# Patient Record
Sex: MALE | Race: ASIAN | NOT HISPANIC OR LATINO | Employment: FULL TIME | URBAN - METROPOLITAN AREA
[De-identification: names, ages, dates, MRNs, and addresses within clinical notes are randomized per-mention and may not be internally consistent; named-entity substitution may affect disease eponyms.]

---

## 2021-07-25 ENCOUNTER — APPOINTMENT (OUTPATIENT)
Dept: GENERAL RADIOLOGY | Facility: HOSPITAL | Age: 51
End: 2021-07-25

## 2021-07-25 ENCOUNTER — HOSPITAL ENCOUNTER (EMERGENCY)
Facility: HOSPITAL | Age: 51
Discharge: HOME | End: 2021-07-25
Attending: EMERGENCY MEDICINE | Admitting: EMERGENCY MEDICINE

## 2021-07-25 VITALS
DIASTOLIC BLOOD PRESSURE: 78 MMHG | HEART RATE: 54 BPM | WEIGHT: 182.98 LBS | TEMPERATURE: 97.5 F | SYSTOLIC BLOOD PRESSURE: 120 MMHG | OXYGEN SATURATION: 97 % | HEIGHT: 69 IN | RESPIRATION RATE: 17 BRPM | BODY MASS INDEX: 27.1 KG/M2

## 2021-07-25 DIAGNOSIS — S90.211A CONTUSION OF RIGHT GREAT TOE WITH DAMAGE TO NAIL, INITIAL ENCOUNTER: ICD-10-CM

## 2021-07-25 DIAGNOSIS — S91.209A AVULSION OF TOENAIL OF RIGHT FOOT: Primary | ICD-10-CM

## 2021-07-25 PROCEDURE — 99282 EMERGENCY DEPT VISIT SF MDM: CPT

## 2021-07-25 PROCEDURE — 73660 X-RAY EXAM OF TOE(S): CPT

## 2021-07-25 RX ORDER — LIDOCAINE HYDROCHLORIDE 20 MG/ML
10 INJECTION, SOLUTION INFILTRATION; PERINEURAL ONCE
Status: COMPLETED | OUTPATIENT
Start: 2021-07-25 | End: 2021-07-25

## 2021-07-25 RX ORDER — CEPHALEXIN 500 MG/1
500 CAPSULE ORAL 4 TIMES DAILY
Qty: 40 CAPSULE | Refills: 0 | Status: SHIPPED | OUTPATIENT
Start: 2021-07-25 | End: 2021-08-04

## 2021-07-25 RX ORDER — GINSENG 100 MG
CAPSULE ORAL ONCE
Status: COMPLETED | OUTPATIENT
Start: 2021-07-25 | End: 2021-07-25

## 2021-07-25 RX ADMIN — LIDOCAINE HYDROCHLORIDE 3 ML: 20 INJECTION, SOLUTION INFILTRATION; PERINEURAL at 22:50

## 2021-07-25 RX ADMIN — Medication: at 23:46

## 2021-07-26 NOTE — DISCHARGE INSTRUCTIONS
Elevate, apply ice to the back of your foot for 20 mins on and 20 mins off. Change dressing in 48 hours, then daily.

## 2021-07-26 NOTE — ED PROVIDER NOTES
Subjective   Was dragging a heavy metal pole in his garage and hit his right big toe with it. Complaining of continued pain/swelling      History provided by:  Patient   used: No    Toe Injury  Location:  Toe  Toe location:  R great toe  Pain details:     Quality:  Throbbing    Radiates to:  Does not radiate    Severity:  Moderate    Onset quality:  Sudden    Timing:  Constant    Progression:  Worsening  Chronicity:  New  Dislocation: no    Foreign body present:  No foreign bodies  Prior injury to area:  No      Review of Systems   Constitutional: Negative.    HENT: Negative.    Eyes: Negative.    Respiratory: Negative.    Cardiovascular: Negative.    Gastrointestinal: Negative.    Endocrine: Negative.    Genitourinary: Negative.    Musculoskeletal: Negative.    Skin: Negative.    Allergic/Immunologic: Negative.    Neurological: Negative.    Hematological: Negative.    Psychiatric/Behavioral: Negative.        History reviewed. No pertinent past medical history.    No Known Allergies    History reviewed. No pertinent surgical history.    Family History   Problem Relation Age of Onset   • Heart failure Mother    • Hypertension Mother    • Heart failure Father    • Hypertension Father        Social History     Socioeconomic History   • Marital status:      Spouse name: Not on file   • Number of children: Not on file   • Years of education: Not on file   • Highest education level: Not on file   Tobacco Use   • Smoking status: Light Tobacco Smoker     Years: 30.00   • Smokeless tobacco: Never Used   Vaping Use   • Vaping Use: Some days   Substance and Sexual Activity   • Alcohol use: Yes     Alcohol/week: 1.0 standard drinks     Types: 1 Cans of beer per week     Comment: 6 PACK A WEEK           Objective   Physical Exam  Constitutional:       Appearance: Normal appearance.   HENT:      Head: Normocephalic and atraumatic.      Nose: Nose normal.      Mouth/Throat:      Mouth: Mucous membranes  are moist.   Eyes:      Pupils: Pupils are equal, round, and reactive to light.   Cardiovascular:      Rate and Rhythm: Normal rate and regular rhythm.      Pulses: Normal pulses.   Pulmonary:      Effort: Pulmonary effort is normal.      Breath sounds: Normal breath sounds.   Abdominal:      General: Abdomen is flat. Bowel sounds are normal.      Palpations: Abdomen is soft.   Musculoskeletal:      Cervical back: Normal range of motion.        Feet:       Comments: Partial nail avulsion, distal area of toe red and swollen   Skin:     General: Skin is warm and dry.      Capillary Refill: Capillary refill takes less than 2 seconds.   Neurological:      General: No focal deficit present.      Mental Status: He is alert and oriented to person, place, and time. Mental status is at baseline.   Psychiatric:         Mood and Affect: Mood normal.         Nail Removal    Date/Time: 7/25/2021 11:44 PM  Performed by: Rosa Isela Alvarez APRN  Authorized by: Wesley Faulkner MD     Consent:     Consent obtained:  Verbal    Consent given by:  Patient    Risks discussed:  Infection, pain, permanent nail deformity and bleeding  Location:     Foot:  R big toe  Pre-procedure details:     Skin preparation:  Alcohol and Betadine    Preparation: Patient was prepped and draped in the usual sterile fashion    Anesthesia (see MAR for exact dosages):     Anesthesia method:  Nerve block    Block location:  Right great toe    Block needle gauge:  27 G    Block anesthetic:  Lidocaine 1% w/o epi    Block injection procedure:  Anatomic landmarks identified, introduced needle, incremental injection, negative aspiration for blood and anatomic landmarks palpated    Block outcome:  Anesthesia achieved  Nail Removal:     Nail removed:  Complete    Nail bed repaired: no      Removed nail replaced and anchored: no    Post-procedure details:     Dressing:  Post-op shoe and antibiotic ointment    Patient tolerance of procedure:  Tolerated well, no immediate  complications               ED Course                                           MDM  Number of Diagnoses or Management Options  Avulsion of toenail of right foot: minor  Contusion of right great toe with damage to nail, initial encounter: new and requires workup     Amount and/or Complexity of Data Reviewed  Tests in the radiology section of CPT®: reviewed and ordered    Risk of Complications, Morbidity, and/or Mortality  Presenting problems: low  Diagnostic procedures: low  Management options: low    Patient Progress  Patient progress: stable      Final diagnoses:   Avulsion of toenail of right foot   Contusion of right great toe with damage to nail, initial encounter       ED Disposition  ED Disposition     ED Disposition Condition Comment    Discharge Stable           Provider, No Known  [facility]  [place] [address]      As needed         Medication List      New Prescriptions    cephalexin 500 MG capsule  Commonly known as: KEFLEX  Take 1 capsule by mouth 4 (Four) Times a Day for 10 days.     diclofenac 50 MG EC tablet  Commonly known as: VOLTAREN  Take 1 tablet by mouth 3 (Three) Times a Day.           Where to Get Your Medications      These medications were sent to [id number] - [place], [place] - [address] AT [facility] - [phone] [facility] [phone] FX  [address] [place] [address] [place] [address]    Hours: 24-hours Phone: [phone]   · cephalexin 500 MG capsule  · diclofenac 50 MG EC tablet          Rosa Isela Alvarez, APRN  07/25/21 [id number]

## 2022-01-24 ENCOUNTER — LAB (OUTPATIENT)
Dept: LAB | Facility: HOSPITAL | Age: 52
End: 2022-01-24

## 2022-01-24 ENCOUNTER — TRANSCRIBE ORDERS (OUTPATIENT)
Dept: LAB | Facility: HOSPITAL | Age: 52
End: 2022-01-24

## 2022-01-24 DIAGNOSIS — E55.9 VITAMIN D DEFICIENCY: ICD-10-CM

## 2022-01-24 DIAGNOSIS — N39.0 URINARY TRACT INFECTION WITHOUT HEMATURIA, SITE UNSPECIFIED: ICD-10-CM

## 2022-01-24 DIAGNOSIS — Z94.0 KIDNEY TRANSPLANTED: ICD-10-CM

## 2022-01-24 DIAGNOSIS — Z94.0 KIDNEY TRANSPLANTED: Primary | ICD-10-CM

## 2022-01-24 LAB
25(OH)D3 SERPL-MCNC: 27.2 NG/ML
ALBUMIN SERPL-MCNC: 4.6 G/DL (ref 3.5–5.2)
ALBUMIN UR-MCNC: <1.2 MG/DL
ALBUMIN/GLOB SERPL: 1.8 G/DL
ALP SERPL-CCNC: 48 U/L (ref 39–117)
ALT SERPL W P-5'-P-CCNC: 28 U/L (ref 1–41)
ANION GAP SERPL CALCULATED.3IONS-SCNC: 7.4 MMOL/L (ref 5–15)
AST SERPL-CCNC: 20 U/L (ref 1–40)
BASOPHILS # BLD AUTO: 0.05 10*3/MM3 (ref 0–0.2)
BASOPHILS NFR BLD AUTO: 0.8 % (ref 0–1.5)
BILIRUB SERPL-MCNC: 0.5 MG/DL (ref 0–1.2)
BILIRUB UR QL STRIP: NEGATIVE
BUN SERPL-MCNC: 12 MG/DL (ref 6–20)
BUN/CREAT SERPL: 10.6 (ref 7–25)
CALCIUM SPEC-SCNC: 9.8 MG/DL (ref 8.6–10.5)
CHLORIDE SERPL-SCNC: 105 MMOL/L (ref 98–107)
CLARITY UR: CLEAR
CO2 SERPL-SCNC: 24.6 MMOL/L (ref 22–29)
COLOR UR: YELLOW
CREAT SERPL-MCNC: 1.13 MG/DL (ref 0.76–1.27)
CREAT UR-MCNC: 32.4 MG/DL
CREAT UR-MCNC: 36.5 MG/DL
DEPRECATED RDW RBC AUTO: 40.9 FL (ref 37–54)
EOSINOPHIL # BLD AUTO: 0.1 10*3/MM3 (ref 0–0.4)
EOSINOPHIL NFR BLD AUTO: 1.6 % (ref 0.3–6.2)
ERYTHROCYTE [DISTWIDTH] IN BLOOD BY AUTOMATED COUNT: 12.8 % (ref 12.3–15.4)
GFR SERPL CREATININE-BSD FRML MDRD: 69 ML/MIN/1.73
GFR SERPL CREATININE-BSD FRML MDRD: 84 ML/MIN/1.73
GLOBULIN UR ELPH-MCNC: 2.5 GM/DL
GLUCOSE SERPL-MCNC: 93 MG/DL (ref 65–99)
GLUCOSE UR STRIP-MCNC: NEGATIVE MG/DL
HCT VFR BLD AUTO: 48.2 % (ref 37.5–51)
HGB BLD-MCNC: 16.9 G/DL (ref 13–17.7)
HGB UR QL STRIP.AUTO: NEGATIVE
IMM GRANULOCYTES # BLD AUTO: 0.01 10*3/MM3 (ref 0–0.05)
IMM GRANULOCYTES NFR BLD AUTO: 0.2 % (ref 0–0.5)
KETONES UR QL STRIP: NEGATIVE
LEUKOCYTE ESTERASE UR QL STRIP.AUTO: NEGATIVE
LYMPHOCYTES # BLD AUTO: 1.34 10*3/MM3 (ref 0.7–3.1)
LYMPHOCYTES NFR BLD AUTO: 20.8 % (ref 19.6–45.3)
MCH RBC QN AUTO: 30.8 PG (ref 26.6–33)
MCHC RBC AUTO-ENTMCNC: 35.1 G/DL (ref 31.5–35.7)
MCV RBC AUTO: 87.8 FL (ref 79–97)
MICROALBUMIN/CREAT UR: NORMAL MG/G{CREAT}
MONOCYTES # BLD AUTO: 0.39 10*3/MM3 (ref 0.1–0.9)
MONOCYTES NFR BLD AUTO: 6.1 % (ref 5–12)
NEUTROPHILS NFR BLD AUTO: 4.54 10*3/MM3 (ref 1.7–7)
NEUTROPHILS NFR BLD AUTO: 70.5 % (ref 42.7–76)
NITRITE UR QL STRIP: NEGATIVE
NRBC BLD AUTO-RTO: 0 /100 WBC (ref 0–0.2)
PH UR STRIP.AUTO: 6 [PH] (ref 5–8)
PLATELET # BLD AUTO: 263 10*3/MM3 (ref 140–450)
PMV BLD AUTO: 10.2 FL (ref 6–12)
POTASSIUM SERPL-SCNC: 3.9 MMOL/L (ref 3.5–5.2)
PROT ?TM UR-MCNC: <4 MG/DL
PROT SERPL-MCNC: 7.1 G/DL (ref 6–8.5)
PROT UR QL STRIP: NEGATIVE
PROT/CREAT UR: NORMAL MG/G{CREAT}
RBC # BLD AUTO: 5.49 10*6/MM3 (ref 4.14–5.8)
SODIUM SERPL-SCNC: 137 MMOL/L (ref 136–145)
SP GR UR STRIP: 1.01 (ref 1–1.03)
UROBILINOGEN UR QL STRIP: NORMAL
WBC NRBC COR # BLD: 6.43 10*3/MM3 (ref 3.4–10.8)

## 2022-01-24 PROCEDURE — 84156 ASSAY OF PROTEIN URINE: CPT

## 2022-01-24 PROCEDURE — 81003 URINALYSIS AUTO W/O SCOPE: CPT

## 2022-01-24 PROCEDURE — 82652 VIT D 1 25-DIHYDROXY: CPT

## 2022-01-24 PROCEDURE — 85025 COMPLETE CBC W/AUTO DIFF WBC: CPT

## 2022-01-24 PROCEDURE — 82306 VITAMIN D 25 HYDROXY: CPT

## 2022-01-24 PROCEDURE — 82570 ASSAY OF URINE CREATININE: CPT

## 2022-01-24 PROCEDURE — 36415 COLL VENOUS BLD VENIPUNCTURE: CPT

## 2022-01-24 PROCEDURE — 80053 COMPREHEN METABOLIC PANEL: CPT

## 2022-01-24 PROCEDURE — 87086 URINE CULTURE/COLONY COUNT: CPT

## 2022-01-24 PROCEDURE — 82043 UR ALBUMIN QUANTITATIVE: CPT

## 2022-01-26 LAB
1,25(OH)2D SERPL-MCNC: 50.1 PG/ML (ref 19.9–79.3)
BACTERIA SPEC AEROBE CULT: NO GROWTH

## 2024-02-01 ENCOUNTER — TELEPHONE (OUTPATIENT)
Dept: ORTHOPEDIC SURGERY | Facility: CLINIC | Age: 54
End: 2024-02-01
Payer: COMMERCIAL

## 2024-02-01 NOTE — TELEPHONE ENCOUNTER
BILATERAL CARPAL TUNNEL. RCV'D AND INDEXED- PN 6.6.22, OP REPORT 4.12.22, PATHOLOGY 2.15.22   Rhomboid Transposition Flap Text: The defect edges were debeveled with a #15 scalpel blade.  Given the location of the defect and the proximity to free margins a rhomboid transposition flap was deemed most appropriate.  Using a sterile surgical marker, an appropriate rhomboid flap was drawn incorporating the defect.    The area thus outlined was incised deep to adipose tissue with a #15 scalpel blade.  The skin margins were undermined to an appropriate distance in all directions utilizing iris scissors.

## 2024-03-28 ENCOUNTER — TELEPHONE (OUTPATIENT)
Dept: ORTHOPEDIC SURGERY | Facility: CLINIC | Age: 54
End: 2024-03-28

## 2024-03-28 ENCOUNTER — OFFICE VISIT (OUTPATIENT)
Dept: ORTHOPEDIC SURGERY | Facility: CLINIC | Age: 54
End: 2024-03-28
Payer: OTHER MISCELLANEOUS

## 2024-03-28 VITALS
SYSTOLIC BLOOD PRESSURE: 147 MMHG | HEIGHT: 69 IN | OXYGEN SATURATION: 98 % | DIASTOLIC BLOOD PRESSURE: 99 MMHG | WEIGHT: 169 LBS | BODY MASS INDEX: 25.03 KG/M2 | HEART RATE: 64 BPM

## 2024-03-28 DIAGNOSIS — S69.92XA LEFT WRIST INJURY, INITIAL ENCOUNTER: ICD-10-CM

## 2024-03-28 DIAGNOSIS — M25.532 LEFT WRIST PAIN: ICD-10-CM

## 2024-03-28 DIAGNOSIS — S52.501A CLOSED FRACTURE OF DISTAL END OF RIGHT RADIUS, UNSPECIFIED FRACTURE MORPHOLOGY, INITIAL ENCOUNTER: ICD-10-CM

## 2024-03-28 DIAGNOSIS — M79.602 LEFT ARM PAIN: Primary | ICD-10-CM
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
## 2024-05-16 ENCOUNTER — TREATMENT (OUTPATIENT)
Dept: PHYSICAL THERAPY | Facility: CLINIC | Age: 54
End: 2024-05-16
Payer: COMMERCIAL

## 2024-05-16 DIAGNOSIS — S52.521D CLOSED TORUS FRACTURE OF DISTAL END OF RIGHT RADIUS WITH ROUTINE HEALING, SUBSEQUENT ENCOUNTER: Primary | ICD-10-CM

## 2024-05-16 DIAGNOSIS — M25.631 STIFFNESS OF RIGHT WRIST JOINT: ICD-10-CM

## 2024-05-16 DIAGNOSIS — M25.531 RIGHT WRIST PAIN: ICD-10-CM

## 2024-05-16 DIAGNOSIS — S52.571S: ICD-10-CM
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
## 2024-05-20 ENCOUNTER — TREATMENT (OUTPATIENT)
Dept: PHYSICAL THERAPY | Facility: CLINIC | Age: 54
End: 2024-05-20
Payer: COMMERCIAL

## 2024-05-20 DIAGNOSIS — M25.631 STIFFNESS OF RIGHT WRIST JOINT: ICD-10-CM

## 2024-05-20 DIAGNOSIS — S52.521D CLOSED TORUS FRACTURE OF DISTAL END OF RIGHT RADIUS WITH ROUTINE HEALING, SUBSEQUENT ENCOUNTER: Primary | ICD-10-CM

## 2024-05-20 DIAGNOSIS — M25.531 RIGHT WRIST PAIN: ICD-10-CM

## 2024-05-20 DIAGNOSIS — S52.571S: ICD-10-CM

## 2024-05-20 PROCEDURE — 97110 THERAPEUTIC EXERCISES: CPT | Performed by: OCCUPATIONAL THERAPIST

## 2024-05-20 PROCEDURE — 97530 THERAPEUTIC ACTIVITIES: CPT | Performed by: OCCUPATIONAL THERAPIST

## 2024-05-20 PROCEDURE — 97140 MANUAL THERAPY 1/> REGIONS: CPT | Performed by: OCCUPATIONAL THERAPIST

## 2024-05-20 PROCEDURE — 97112 NEUROMUSCULAR REEDUCATION: CPT | Performed by: OCCUPATIONAL THERAPIST

## 2024-05-20 NOTE — LETTER
Progress Note  5/20/2024  Cecil Bell MD    Re: Mendel Mg  ________________________________________________________________    Mr. Mendel Mg, has attended 2/2 OT sessions.  Treatment has consisted of: AROM, PROM, scar mgmt, pain mgmt, neuro re-ed, and functional re-ed     S: Mr. Mendel Mg states: exercises went well and currently not having any pain.     O:   0-65     0-65  0-85 0-95 0-55  0-90 0-90 0-65  0-90 0-90 0-65  0-90 0-90 0-65     Pro 60 degrees  Sup 40 degrees    Shoulder flexion 160 degrees  Shoulder  degrees  Elbow 0-150 degrees      A: Started pro/ sup in clinic only.  Pt is doing excellent.  Scar is adhered, working on scar massage and scar pad provided.     P: Please advise after your exam.    Thank you for this referral.

## 2024-05-20 NOTE — PROGRESS NOTES
Occupational Therapy Daily Treatment Note   [address] [place] [address]    Patient: Mendel Mg   : 1970  Referring practitioner: Cecil Bell MD  Date of Initial Visit: Type: THERAPY  Noted: 2024  Today's Date: 2024  Patient seen for 2 sessions    ICD-10-CM ICD-9-CM   1. Closed torus fracture of distal end of right radius with routine healing, subsequent encounter  S52.521D V54.12   2. Closed die-punch fracture of right radius, sequela  S52.571S 905.2   3. Stiffness of right wrist joint  M25.631 719.53   4. Right wrist pain  M25.531 719.43          Mendel Mg reports The exercises went well, the       Functional status  I am sleeping better now that the brace isn't rubbing.    Objective   See Exercise, Manual, and Modality Logs for complete treatment.   Shoulder flexion 160 degrees  Shoulder  degrees  Elbow 0-150 degrees  Pro 60 degrees  Sup 40 degrees    Scar pad made for night use.     Assessment/Plan    Started pro/ sup in clinic only.  Pt is doing excellent.  Scar is adhered, working on scar massage and scar pad provided.    Cont per POC           Timed:  Manual Therapy:    10     mins  54013;  Therapeutic Exercise:    20     mins  48437;  Therapeutic Activity:    10     mins  93733;     Neuromuscular Ambrocio:    10    mins  46851;    Ultrasound:     0     mins  78054;    Electrical Stimulation:    0     mins  90735;    Untimed:  Electrical Stimulation:    0     mins  77028 ( );  Fluidotherapy:        0    mins  45964  Dry Needlin    mins  08340    Timed Treatment:   50   mins   Total Treatment:     50   mins    OT SIGNATURE: DANIA Groves, OTR/L, CHT     Electronically signed    [place] LICENSE: [id number]

## 2024-05-30 ENCOUNTER — TREATMENT (OUTPATIENT)
Dept: PHYSICAL THERAPY | Facility: CLINIC | Age: 54
End: 2024-05-30
Payer: COMMERCIAL

## 2024-05-30 DIAGNOSIS — M25.531 RIGHT WRIST PAIN: ICD-10-CM

## 2024-05-30 DIAGNOSIS — S52.521D CLOSED TORUS FRACTURE OF DISTAL END OF RIGHT RADIUS WITH ROUTINE HEALING, SUBSEQUENT ENCOUNTER: Primary | ICD-10-CM

## 2024-05-30 DIAGNOSIS — S52.571S: ICD-10-CM

## 2024-05-30 DIAGNOSIS — M25.631 STIFFNESS OF RIGHT WRIST JOINT: ICD-10-CM

## 2024-05-30 NOTE — PROGRESS NOTES
Occupational Therapy Daily Treatment Note   [address] [place] [address]    Patient: Mendel Mg   : 1970  Referring practitioner: Cecil Bell MD  Date of Initial Visit: Type: THERAPY  Noted: 2024  Today's Date: 2024  Patient seen for 3 sessions    ICD-10-CM ICD-9-CM   1. Closed torus fracture of distal end of right radius with routine healing, subsequent encounter  S52.521D V54.12   2. Closed die-punch fracture of right radius, sequela  S52.571S 905.2   3. Stiffness of right wrist joint  M25.631 719.53   4. Right wrist pain  M25.531 719.43          Mendel Mg reports Exercises are working, I have no pain, and feel great.        Functional status playing guitar in the brace.    Objective   See Exercise, Manual, and Modality Logs for complete treatment.   0-65     0-75  0-90 0-95 0-60  0-90 0-95 0-60  0-90 0-95 0-60  0-90 0-90 0-60    Pro 45 degrees  Supination 45 degrees    Shoulder and elbow WFL    Orthotic cut down to 5 cm distal to antecubital flexion crease to distal palmar crease for long form forearm-based wrist support, should stay in the brace full-time per MD order.   Educated patient to wear scar pad nightly, clean with mild soap and water.    Assessment/Plan  Plan to hold therapy, as patient has full fist, elbow AROM and shoulder ROM at this time.  Pt will call when he returns to MD and has a surgical date/return to therapy estimate.  Educated patient to call in the mean time if questions or orthotic needs come up.       Cont per POC           Timed:  Manual Therapy:    10     mins  54217;  Therapeutic Exercise:    10     mins  12797;  Therapeutic Activity:    0     mins  43657;     Neuromuscular Ambrocio:    0    mins  39494;    Ultrasound:     0     mins  18770;    Orthtoic modification:    10     mins  42793;    Untimed:  Electrical Stimulation:    0     mins  06674 ( );  Fluidotherapy:        0    mins  11882  Dry Needlin    mins   32121    Timed Treatment:   30   mins   Total Treatment:     30   mins    OT SIGNATURE: DANIA Groves, OTR/L, CHT     Electronically signed    [place] LICENSE: [id number]

## 2024-08-01 ENCOUNTER — DOCUMENTATION (OUTPATIENT)
Dept: PHYSICAL THERAPY | Facility: CLINIC | Age: 54
End: 2024-08-01
Payer: COMMERCIAL

## 2024-08-01 NOTE — PROGRESS NOTES
Discharge Summary  Discharge Summary from Occupational Therapy   [address] [place] [id number]    Patient Information  Mendel Mg  1970    Dates OT visit: 5/16/24-5/30/24  Number of Visits: 3     Discharge Status of Patient: See MD Note dated 5/30/24    Goals: Partially Met    Visit Diagnoses:  1. Closed torus fracture of distal end of right radius with routine healing, subsequent encounter  S52.521D V54.12   2. Closed die-punch fracture of right radius, sequela  S52.571S 905.2   3. Stiffness of right wrist joint  M25.521 719.53   4. Right wrist pain  M25.531 719.43       Discharge Plan: Continue with current home exercise program as instructed  Future need for rehabilitation activities  Patient to return to referring/providing physician    Comments Plan to hold therapy, as patient has full fist, elbow AROM and shoulder ROM at this time. Pt will call when he returns to MD and has a surgical date/return to therapy estimate. Educated patient to call in the mean time if questions or orthotic needs come up.     Date of Discharge 8/1/24        DANIA Groves, OTR/L, CHT  Occupational Therapist, Certified Hand therapist    Electronically Signed   [place] LICENSE: [id number]

## 2024-10-02 ENCOUNTER — TRANSCRIBE ORDERS (OUTPATIENT)
Dept: PHYSICAL THERAPY | Facility: CLINIC | Age: 54
End: 2024-10-02
Payer: COMMERCIAL

## 2024-10-02 DIAGNOSIS — S52.571A OTHER CLOSED INTRA-ARTICULAR FRACTURE OF DISTAL END OF RIGHT RADIUS, INITIAL ENCOUNTER: Primary | ICD-10-CM

## 2024-10-02 DIAGNOSIS — Z98.890 POSTOPERATIVE STATE: ICD-10-CM

## 2024-10-02 DIAGNOSIS — Z98.890 S/P HARDWARE REMOVAL: ICD-10-CM

## 2024-10-02 DIAGNOSIS — Z74.09 STIFFNESS DUE TO IMMOBILITY: ICD-10-CM

## 2024-10-02 DIAGNOSIS — M25.60 STIFFNESS DUE TO IMMOBILITY: ICD-10-CM

## 2024-10-24 ENCOUNTER — APPOINTMENT (OUTPATIENT)
Dept: CT IMAGING | Facility: HOSPITAL | Age: 54
End: 2024-10-24
Payer: COMMERCIAL

## 2024-10-24 ENCOUNTER — HOSPITAL ENCOUNTER (EMERGENCY)
Facility: HOSPITAL | Age: 54
Discharge: HOME | End: 2024-10-24
Attending: EMERGENCY MEDICINE | Admitting: EMERGENCY MEDICINE
Payer: COMMERCIAL

## 2024-10-24 VITALS
WEIGHT: 172.18 LBS | HEIGHT: 69 IN | BODY MASS INDEX: 25.5 KG/M2 | HEART RATE: 68 BPM | TEMPERATURE: 98.8 F | SYSTOLIC BLOOD PRESSURE: 161 MMHG | OXYGEN SATURATION: 98 % | DIASTOLIC BLOOD PRESSURE: 94 MMHG | RESPIRATION RATE: 18 BRPM

## 2024-10-24 DIAGNOSIS — R31.9 HEMATURIA, UNSPECIFIED TYPE: Primary | ICD-10-CM

## 2024-10-24 DIAGNOSIS — N21.0 BLADDER STONES: ICD-10-CM

## 2024-10-24 LAB
ALBUMIN SERPL-MCNC: 4.4 G/DL (ref 3.5–5.2)
ALBUMIN/GLOB SERPL: 1.5 G/DL
ALP SERPL-CCNC: 56 U/L (ref 39–117)
ALT SERPL W P-5'-P-CCNC: 20 U/L (ref 1–41)
ANION GAP SERPL CALCULATED.3IONS-SCNC: 9.2 MMOL/L (ref 5–15)
AST SERPL-CCNC: 15 U/L (ref 1–40)
BACTERIA UR QL AUTO: ABNORMAL /HPF
BASOPHILS # BLD AUTO: 0.02 10*3/MM3 (ref 0–0.2)
BASOPHILS NFR BLD AUTO: 0.3 % (ref 0–1.5)
BILIRUB SERPL-MCNC: 0.5 MG/DL (ref 0–1.2)
BILIRUB UR QL STRIP: NEGATIVE
BUN SERPL-MCNC: 14 MG/DL (ref 6–20)
BUN/CREAT SERPL: 12.1 (ref 7–25)
CALCIUM SPEC-SCNC: 9.7 MG/DL (ref 8.6–10.5)
CHLORIDE SERPL-SCNC: 104 MMOL/L (ref 98–107)
CLARITY UR: CLEAR
CO2 SERPL-SCNC: 25.8 MMOL/L (ref 22–29)
COLOR UR: YELLOW
CREAT SERPL-MCNC: 1.16 MG/DL (ref 0.76–1.27)
D-LACTATE SERPL-SCNC: 0.9 MMOL/L (ref 0.5–2)
DEPRECATED RDW RBC AUTO: 40.6 FL (ref 37–54)
EGFRCR SERPLBLD CKD-EPI 2021: 75.8 ML/MIN/1.73
EOSINOPHIL # BLD AUTO: 0.06 10*3/MM3 (ref 0–0.4)
EOSINOPHIL NFR BLD AUTO: 1 % (ref 0.3–6.2)
ERYTHROCYTE [DISTWIDTH] IN BLOOD BY AUTOMATED COUNT: 13 % (ref 12.3–15.4)
GLOBULIN UR ELPH-MCNC: 3 GM/DL
GLUCOSE SERPL-MCNC: 111 MG/DL (ref 65–99)
GLUCOSE UR STRIP-MCNC: NEGATIVE MG/DL
HCT VFR BLD AUTO: 49.1 % (ref 37.5–51)
HGB BLD-MCNC: 17.2 G/DL (ref 13–17.7)
HGB UR QL STRIP.AUTO: ABNORMAL
HOLD SPECIMEN: NORMAL
HOLD SPECIMEN: NORMAL
HYALINE CASTS UR QL AUTO: ABNORMAL /LPF
IMM GRANULOCYTES # BLD AUTO: 0.02 10*3/MM3 (ref 0–0.05)
IMM GRANULOCYTES NFR BLD AUTO: 0.3 % (ref 0–0.5)
KETONES UR QL STRIP: NEGATIVE
LEUKOCYTE ESTERASE UR QL STRIP.AUTO: ABNORMAL
LIPASE SERPL-CCNC: 51 U/L (ref 13–60)
LYMPHOCYTES # BLD AUTO: 1.07 10*3/MM3 (ref 0.7–3.1)
LYMPHOCYTES NFR BLD AUTO: 17.2 % (ref 19.6–45.3)
MCH RBC QN AUTO: 30.3 PG (ref 26.6–33)
MCHC RBC AUTO-ENTMCNC: 35 G/DL (ref 31.5–35.7)
MCV RBC AUTO: 86.4 FL (ref 79–97)
MONOCYTES # BLD AUTO: 0.2 10*3/MM3 (ref 0.1–0.9)
MONOCYTES NFR BLD AUTO: 3.2 % (ref 5–12)
NEUTROPHILS NFR BLD AUTO: 4.86 10*3/MM3 (ref 1.7–7)
NEUTROPHILS NFR BLD AUTO: 78 % (ref 42.7–76)
NITRITE UR QL STRIP: NEGATIVE
NRBC BLD AUTO-RTO: 0 /100 WBC (ref 0–0.2)
PH UR STRIP.AUTO: 6 [PH] (ref 5–8)
PLATELET # BLD AUTO: 289 10*3/MM3 (ref 140–450)
PMV BLD AUTO: 9.3 FL (ref 6–12)
POTASSIUM SERPL-SCNC: 4.4 MMOL/L (ref 3.5–5.2)
PROT SERPL-MCNC: 7.4 G/DL (ref 6–8.5)
PROT UR QL STRIP: ABNORMAL
RBC # BLD AUTO: 5.68 10*6/MM3 (ref 4.14–5.8)
RBC # UR STRIP: ABNORMAL /HPF
REF LAB TEST METHOD: ABNORMAL
SODIUM SERPL-SCNC: 139 MMOL/L (ref 136–145)
SP GR UR STRIP: 1.02 (ref 1–1.03)
SQUAMOUS #/AREA URNS HPF: ABNORMAL /HPF
UROBILINOGEN UR QL STRIP: ABNORMAL
WBC # UR STRIP: ABNORMAL /HPF
WBC NRBC COR # BLD AUTO: 6.23 10*3/MM3 (ref 3.4–10.8)
WHOLE BLOOD HOLD COAG: NORMAL
WHOLE BLOOD HOLD SPECIMEN: NORMAL

## 2024-10-24 PROCEDURE — 83690 ASSAY OF LIPASE: CPT | Performed by: EMERGENCY MEDICINE

## 2024-10-24 PROCEDURE — 80053 COMPREHEN METABOLIC PANEL: CPT | Performed by: EMERGENCY MEDICINE

## 2024-10-24 PROCEDURE — 83605 ASSAY OF LACTIC ACID: CPT | Performed by: EMERGENCY MEDICINE

## 2024-10-24 PROCEDURE — 99284 EMERGENCY DEPT VISIT MOD MDM: CPT

## 2024-10-24 PROCEDURE — 85025 COMPLETE CBC W/AUTO DIFF WBC: CPT | Performed by: EMERGENCY MEDICINE

## 2024-10-24 PROCEDURE — 81001 URINALYSIS AUTO W/SCOPE: CPT | Performed by: EMERGENCY MEDICINE

## 2024-10-24 PROCEDURE — 74176 CT ABD & PELVIS W/O CONTRAST: CPT

## 2024-10-24 RX ORDER — TAMSULOSIN HYDROCHLORIDE 0.4 MG/1
1 CAPSULE ORAL DAILY
Qty: 30 CAPSULE | Refills: 0 | Status: SHIPPED | OUTPATIENT
Start: 2024-10-24

## 2024-10-24 RX ORDER — SODIUM CHLORIDE 0.9 % (FLUSH) 0.9 %
10 SYRINGE (ML) INJECTION AS NEEDED
Status: DISCONTINUED | OUTPATIENT
Start: 2024-10-24 | End: 2024-10-24 | Stop reason: HOSPADM

## 2024-10-24 NOTE — ED PROVIDER NOTES
Time: 10:39 AM EDT  Date of encounter:  10/24/2024  Independent Historian/Clinical History and Information was obtained by:   Patient and Family    History is limited by: N/A    Chief Complaint: Blood in urine      History of Present Illness:  Patient is a 54 y.o. year old male who presents to the emergency department for evaluation of hematuria.  Patient reports urinating normal this morning and then feeling the need to urinate again but it was bloody.  Denies history of kidney stones.  Does report a remote history of renal transplant due to unknown cause of renal failure when he was 20 years old.  Did have a familial donor and has been off of immunosuppressants for many years as well.  Denies fatigue nausea generalized weakness, fever.      Patient Care Team  Primary Care Provider: Provider, No Known    Past Medical History:     No Known Allergies  Past Medical History:   Diagnosis Date    Carpal tunnel syndrome     Lt/rt wrists     Past Surgical History:   Procedure Laterality Date    CARPAL TUNNEL RELEASE       Family History   Problem Relation Age of Onset    Heart failure Mother     Hypertension Mother     Heart failure Father     Hypertension Father        Home Medications:  Prior to Admission medications    Medication Sig Start Date End Date Taking? Authorizing Provider   diclofenac (VOLTAREN) 50 MG EC tablet Take 1 tablet by mouth 3 (Three) Times a Day.  Patient not taking: Reported on 3/28/2024 7/25/21   Rosa Isela Alvarez APRN        Social History:   Social History     Tobacco Use    Smoking status: Former     Current packs/day: 0.00     Types: Cigarettes     Quit date:      Years since quittin.8    Smokeless tobacco: Never   Vaping Use    Vaping status: Former    Substances: Nicotine   Substance Use Topics    Alcohol use: Yes     Alcohol/week: 1.0 standard drink of alcohol     Types: 1 Cans of beer per week     Comment: 6 PACK A WEEK    Drug use: Never         Review of Systems:  Review of  "Systems   Constitutional:  Negative for chills and fever.   HENT:  Negative for congestion, rhinorrhea and sore throat.    Eyes:  Negative for pain and visual disturbance.   Respiratory:  Negative for apnea, cough, chest tightness and shortness of breath.    Cardiovascular:  Negative for chest pain and palpitations.   Gastrointestinal:  Negative for abdominal pain, diarrhea, nausea and vomiting.   Genitourinary:  Positive for hematuria. Negative for difficulty urinating and dysuria.   Musculoskeletal:  Negative for joint swelling and myalgias.   Skin:  Negative for color change.   Neurological:  Negative for seizures and headaches.   Psychiatric/Behavioral: Negative.     All other systems reviewed and are negative.       Physical Exam:  /98 (BP Location: Left arm, Patient Position: Sitting)   Pulse 67   Temp 98.8 °F (37.1 °C) (Oral)   Resp 17   Ht 175.3 cm (69\")   Wt 78.1 kg (172 lb 2.9 oz)   SpO2 99%   BMI 25.43 kg/m²     Physical Exam  Vitals and nursing note reviewed.   Constitutional:       General: He is not in acute distress.     Appearance: Normal appearance. He is not toxic-appearing.   HENT:      Head: Normocephalic and atraumatic.      Jaw: There is normal jaw occlusion.   Eyes:      General: Lids are normal.      Extraocular Movements: Extraocular movements intact.      Conjunctiva/sclera: Conjunctivae normal.      Pupils: Pupils are equal, round, and reactive to light.   Cardiovascular:      Rate and Rhythm: Normal rate and regular rhythm.      Pulses: Normal pulses.      Heart sounds: Normal heart sounds.   Pulmonary:      Effort: Pulmonary effort is normal. No respiratory distress.      Breath sounds: Normal breath sounds. No wheezing or rhonchi.   Abdominal:      General: Abdomen is flat.      Palpations: Abdomen is soft.      Tenderness: There is no abdominal tenderness. There is no guarding or rebound.   Musculoskeletal:         General: Normal range of motion.      Cervical back: " Normal range of motion and neck supple.      Right lower leg: No edema.      Left lower leg: No edema.   Skin:     General: Skin is warm and dry.   Neurological:      Mental Status: He is alert and oriented to person, place, and time. Mental status is at baseline.   Psychiatric:         Mood and Affect: Mood normal.                  Procedures:  Procedures      Medical Decision Making:      Comorbidities that affect care:    Status post renal transplant    External Notes reviewed:    Previous Clinic Note: In surgery office visit for radius fracture management      The following orders were placed and all results were independently analyzed by me:  Orders Placed This Encounter   Procedures    CT Abdomen Pelvis Without Contrast    [place] Draw    Comprehensive Metabolic Panel    Lipase    Urinalysis With Microscopic If Indicated (No Culture) - Urine, Clean Catch    Lactic Acid, Plasma    CBC Auto Differential    Urinalysis, Microscopic Only - Urine, Clean Catch    NPO Diet NPO Type: Strict NPO    Undress & Gown    Insert Peripheral IV    CBC & Differential    Green Top (Gel)    Lavender Top    Gold Top - SST    Light Blue Top       Medications Given in the Emergency Department:  Medications   sodium chloride 0.9 % flush 10 mL (has no administration in time range)        ED Course:         Labs:    Lab Results (last 24 hours)       Procedure Component Value Units Date/Time    CBC & Differential [[id number]]  (Abnormal) Collected: 10/24/24 1003    Specimen: Blood Updated: 10/24/24 1015    Narrative:      The following orders were created for panel order CBC & Differential.  Procedure                               Abnormality         Status                     ---------                               -----------         ------                     CBC Auto Differential[[id number]]        Abnormal            Final result                 Please view results for these tests on the individual orders.    Comprehensive Metabolic  Panel [[id number]]  (Abnormal) Collected: 10/24/24 1003    Specimen: Blood Updated: 10/24/24 1032     Glucose 111 mg/dL      BUN 14 mg/dL      Creatinine 1.16 mg/dL      Sodium 139 mmol/L      Potassium 4.4 mmol/L      Chloride 104 mmol/L      CO2 25.8 mmol/L      Calcium 9.7 mg/dL      Total Protein 7.4 g/dL      Albumin 4.4 g/dL      ALT (SGPT) 20 U/L      AST (SGOT) 15 U/L      Alkaline Phosphatase 56 U/L      Total Bilirubin 0.5 mg/dL      Globulin 3.0 gm/dL      A/G Ratio 1.5 g/dL      BUN/Creatinine Ratio 12.1     Anion Gap 9.2 mmol/L      eGFR 75.8 mL/min/1.73     Narrative:      GFR Normal >60  Chronic Kidney Disease <60  Kidney Failure <15      Lipase [[id number]]  (Normal) Collected: 10/24/24 1003    Specimen: Blood Updated: 10/24/24 1047     Lipase 51 U/L     Lactic Acid, Plasma [[id number]]  (Normal) Collected: 10/24/24 1003    Specimen: Blood Updated: 10/24/24 1032     Lactate 0.9 mmol/L     CBC Auto Differential [[id number]]  (Abnormal) Collected: 10/24/24 1003    Specimen: Blood Updated: 10/24/24 1015     WBC 6.23 10*3/mm3      RBC 5.68 10*6/mm3      Hemoglobin 17.2 g/dL      Hematocrit 49.1 %      MCV 86.4 fL      MCH 30.3 pg      MCHC 35.0 g/dL      RDW 13.0 %      RDW-SD 40.6 fl      MPV 9.3 fL      Platelets 289 10*3/mm3      Neutrophil % 78.0 %      Lymphocyte % 17.2 %      Monocyte % 3.2 %      Eosinophil % 1.0 %      Basophil % 0.3 %      Immature Grans % 0.3 %      Neutrophils, Absolute 4.86 10*3/mm3      Lymphocytes, Absolute 1.07 10*3/mm3      Monocytes, Absolute 0.20 10*3/mm3      Eosinophils, Absolute 0.06 10*3/mm3      Basophils, Absolute 0.02 10*3/mm3      Immature Grans, Absolute 0.02 10*3/mm3      nRBC 0.0 /100 WBC     Urinalysis With Microscopic If Indicated (No Culture) - Urine, Clean Catch [[id number]]  (Abnormal) Collected: 10/24/24 1006    Specimen: Urine, Clean Catch Updated: 10/24/24 1021     Color, UA Yellow     Appearance, UA Clear     pH, UA 6.0     Specific Gravity, UA 1.020      Glucose, UA Negative     Ketones, UA Negative     Bilirubin, UA Negative     Blood, UA Large (3+)     Protein,  mg/dL (2+)     Leuk Esterase, UA Trace     Nitrite, UA Negative     Urobilinogen, UA 0.2 E.U./dL    Urinalysis, Microscopic Only - Urine, Clean Catch [[id number]]  (Abnormal) Collected: 10/24/24 1006    Specimen: Urine, Clean Catch Updated: 10/24/24 1021     RBC, UA Too Numerous to Count /HPF      WBC, UA 6-10 /HPF      Bacteria, UA None Seen /HPF      Squamous Epithelial Cells, UA 0-2 /HPF      Hyaline Casts, UA 0-2 /LPF      Methodology Automated Microscopy             Imaging:    CT Abdomen Pelvis Without Contrast    Result Date: 10/24/2024  CT ABDOMEN PELVIS WO CONTRAST Date of Exam: 10/24/2024 10:53 AM EDT Indication: Flank pain, kidney stone suspected hematuria, renal transplant flank pain. Comparison: None available. Technique: Axial CT images were obtained of the abdomen and pelvis without the administration of contrast. Reconstructed coronal and sagittal images were also obtained. Automated exposure control and iterative construction methods were used. FINDINGS: Abdomen/Pelvis: Lower Chest: 3 mm noncalcified nodule anteriorly right middle lobe likely postinflammatory or infectious. No free air is noted below the diaphragm Organs: Limited noncontrast imaging of the liver demonstrates areas of focal fat along the fissure. No acute abnormality noted on limited imaging. The gallbladder, spleen, pancreas and adrenal glands are unremarkable. Severe atrophy of the native kidneys  is noted. There is no obstructive uropathy of the native kidneys or evidence of associated obstructive uropathy along the course of the ureters. There is a transplant kidney in the right lower quadrant. Trace amount of perinephric fluid noted with some adjacent calcification extending to the abdominal wall. There is no evidence of hydronephrosis or acute abnormality along the course of the ureter  which is implanted  anteriorly within the bladder wall. There is calcification noted within the bladder compatible with bladder stones. No significant wall thickening or perivesicular stranding noted. GI/Bowel: Limited noncontrast imaging of the stomach and small bowel are unremarkable in appearance. Ileocecal valve and appendix appear unremarkable. The colon demonstrates no acute abnormality Pelvis: Findings of the bladder as above. The prostate and pelvic structures demonstrate no acute abnormality. Small fat-containing right inguinal hernia noted Peritoneum/Retroperitoneum: Aorta is normal in caliber. No suspicious retroperitoneal adenopathy Bones/Soft Tissues: No acute osseous abnormality     1. Transplant kidney demonstrates no evidence of obstructive uropathy. Small amount of perinephric fluid along the transplant kidney is nonspecific and may be postsurgical in nature. 2. Stones are noted within the bladder. No obvious inflammatory change of the bladder wall noted. Please correlate with urinalysis and renal function 3. Severe atrophy of the native kidneys bilaterally 4. Noncalcified nodule at the right lung base. Optional follow-up could always be considered in the high risk patient at 12 months Electronically Signed: Jorge Tariq MD  10/24/2024 11:13 AM EDT  Workstation ID: [id number]       Differential Diagnosis and Discussion:    Hematuria: Differential diagnosis includes but is not limited to medications, coagulopathy, glomerulonephritis, nephritis, neoplasm, vascular abnormalities, cystitis, urethritis, neoplasms of the bladder, and autoimmune disorders.    All labs were reviewed and interpreted by me.  CT scan radiology impression was interpreted by me.    MDM  Number of Diagnoses or Management Options  Bladder stones  Hematuria, unspecified type  Diagnosis management comments: In summary this is a 54-year-old male patient who presents to the emergency department for evaluation of hematuria.  Does have a history of renal  transplant many years ago.  CBC independently reviewed and interpreted by me and shows no critical abnormalities.  CMP independently reviewed and interpreted by me and shows no critical abnormalities, specifically normal kidney function.  Urinalysis independently reviewed interpreted by me is positive for hematuria only.  CT scan abdomen pelvis without contrast shows bladder stones, no ureteral lithiasis or other inflammatory or critical findings of the transplanted kidney.  Patient is otherwise well-appearing in no acute distress.  He will be discharged home with prescription Flomax and given urology follow-up regarding the bladder stones.  Very strict return to ER and follow-up instructions have been provided to the patient.                         Patient Care Considerations:    CONSULT: I considered consulting urology, however outpatient follow-up will be appropriate in the situation      Consultants/Shared Management Plan:    None    Social Determinants of Health:    Patient is independent, reliable, and has access to care.       Disposition and Care Coordination:    Discharged: I considered escalation of care by admitting this patient to the hospital, however no obstructive uropathy of either native or transplanted kidney identified    I have explained the patient´s condition, diagnoses and treatment plan based on the information available to me at this time. I have answered questions and addressed any concerns. The patient has a good  understanding of the patient´s diagnosis, condition, and treatment plan as can be expected at this point. The vital signs have been stable. The patient´s condition is stable and appropriate for discharge from the emergency department.      The patient will pursue further outpatient evaluation with the primary care physician or other designated or consulting physician as outlined in the discharge instructions. They are agreeable to this plan of care and follow-up instructions  have been explained in detail. The patient has received these instructions in written format and has expressed an understanding of the discharge instructions. The patient is aware that any significant change in condition or worsening of symptoms should prompt an immediate return to this or the closest emergency department or call to 911.  I have explained discharge medications and the need for follow up with the patient/caretakers. This was also printed in the discharge instructions. Patient was discharged with the following medications and follow up:      Medication List        New Prescriptions      tamsulosin 0.4 MG capsule 24 hr capsule  Commonly known as: FLOMAX  Take 1 capsule by mouth Daily.               Where to Get Your Medications        These medications were sent to [facility]/pharmacy #[id number] - [place], [place] - [address] - [phone] [facility] [phone]  [address] [place] [address]      Hours: 24-hours Phone: [phone]   tamsulosin 0.4 MG capsule 24 hr capsule      Lorene Monsalve MD  [address] [facility] [place] [address]  [phone]    In 1 week         Final diagnoses:   Hematuria, unspecified type   Bladder stones        ED Disposition       ED Disposition   Discharge    Condition   Stable    Comment   --               This medical record created using voice recognition software.             Aris Montague MD  10/24/24 9511

## 2024-10-25 ENCOUNTER — TELEPHONE (OUTPATIENT)
Dept: UROLOGY | Facility: CLINIC | Age: 54
End: 2024-10-25
Payer: COMMERCIAL

## 2024-10-25 NOTE — TELEPHONE ENCOUNTER
Spoke with patient informing him that Dr. Monsalve does not take care of kidney transplant patients.  If surgery is needed the ureter insertion to the bladder is entirely different than in a normal kidney.  I also provided the patient with [facility] phone number [phone].

## 2024-10-25 NOTE — TELEPHONE ENCOUNTER
PT CALLED TO CHECK ON GETTING AN APPT, HE WAS READ THE MESSAGE IN THE REFERRAL ABOUT GOING TO  UROLOGY AS HE HAS HAD A KIDNEY TRANSPLANT. HE DOESN'T UNDERSTAND WHY HE CAN'T BE SEEN FOR THE KIDNEY STONES. HE REPORTS THAT HE IS STILL URINATING BLOOD AND HAVING A LOT OF PAIN. HE DOESN'T THINK IT WOULD BE SAFE TO WAIT UNTIL HE CAN BE SEEN IN [place]. ASKING FOR A CALL TO GET A BETTER UNDERSTANDING OF HIS CONDITION.

## 2024-10-25 NOTE — TELEPHONE ENCOUNTER
He will need to be seen in [place].  Please provide him with the [facility] urology office number.  I do not take care of kidney transplant patients.  If surgery is needed the ureter insertion to the bladder is entirely different than in a normal kidney.

## 2024-11-04 ENCOUNTER — TELEPHONE (OUTPATIENT)
Dept: INTERNAL MEDICINE | Age: 54
End: 2024-11-04

## 2024-11-04 ENCOUNTER — NURSE TRIAGE (OUTPATIENT)
Dept: CALL CENTER | Facility: HOSPITAL | Age: 54
End: 2024-11-04
Payer: COMMERCIAL

## 2024-11-04 NOTE — TELEPHONE ENCOUNTER
[facility] call, HTN issues, and questions. on what to do? New appt. appt. req. /100, HR 63, I do no take BP Meds, I need PCP what to do for BP, I am a kidney transplant  1993, pt. also, I do not have any Drs. now, moved her 3 years ago and have not established.  I have had blood work in [facility], with, nephrology, I have weaned myself off meds. No meds in 20 years. I get blood work and urine, every year. Has been doing well. I need PCP appt. And now BP issues. I have kidney stones and need PCP to get BP  under control before Urology will do surgery. I am not having any symptoms. Of chest pain or headaches, need PCP for BP issues to be controlled.  Returned call back to [facility] for new pt. Appt. As requested, for BP control and new PCP   Reason for Disposition   Systolic BP  >= 160 OR Diastolic >= 100    Additional Information   Negative: Difficult to awaken or acting confused (e.g., disoriented, slurred speech)   Negative: SEVERE difficulty breathing (e.g., struggling for each breath, speaks in single words)   Negative: [1] Weakness of the face, arm or leg on one side of the body AND [2] new-onset   Negative: [1] Numbness (i.e., loss of sensation) of the face, arm or leg on one side of the body AND [2] new-onset   Negative: [1] Chest pain lasts > 5 minutes AND [2] history of heart disease (i.e., heart attack, bypass surgery, angina, angioplasty, CHF)   Negative: [1] Chest pain AND [2] took nitrogylcerin AND [3] pain was not relieved   Negative: Sounds like a life-threatening emergency to the triager   Negative: Symptom is main concern (e.g., headache, chest pain)   Negative: Low blood pressure is main concern   Negative: [1] Systolic BP  >= 160 OR Diastolic >= 100 AND [2] cardiac (e.g., breathing difficulty, chest pain) or neurologic symptoms (e.g., new-onset blurred or double vision, unsteady gait)   Negative: [1] Pregnant 20 or more weeks (or postpartum < 6 weeks) AND [2] new hand or face swelling   Negative: [1]  "Pregnant 20 or more weeks (or postpartum < 6 weeks) AND [2] Systolic BP >= 160 OR Diastolic >= 110   Negative: [1] Systolic BP  >= 200 OR Diastolic >= 120 AND [2] having NO cardiac or neurologic symptoms   Negative: [1] Pregnant 20 or more weeks (or postpartum < 6 weeks) AND [2] Systolic BP  >= 140 OR Diastolic >= 90   Negative: [1] Systolic BP  >= 180 OR Diastolic >= 110 AND [2] missed most recent dose of blood pressure medication   Negative: Systolic BP  >= 180 OR Diastolic >= 110   Negative: Ran out of BP medications   Negative: [1] Taking BP medications AND [2] feels is having side effects (e.g., impotence, cough, dizzy upon standing)   Negative: [1] Systolic BP  >= 130 OR Diastolic >= 80 AND [2] pregnant   Negative: [1] Systolic BP  >= 130 OR Diastolic >= 80 AND [2] taking BP medications    Answer Assessment - Initial Assessment Questions  1. BLOOD PRESSURE: \"What is the blood pressure?\" \"Did you take at least two measurements 5 minutes apart?\"      160/100 HR 63  2. ONSET: \"When did you take your blood pressure?\"      Today   3. HOW: \"How did you take your blood pressure?\" (e.g., automatic home BP monitor, visiting nurse)      Arm cuff  4. HISTORY: \"Do you have a history of high blood pressure?\"      no  5. MEDICINES: \"Are you taking any medicines for blood pressure?\" \"Have you missed any doses recently?\"      No meds  6. OTHER SYMPTOMS: \"Do you have any symptoms?\" (e.g., blurred vision, chest pain, difficulty breathing, headache, weakness)      Kidney stones  7. PREGNANCY: \"Is there any chance you are pregnant?\" \"When was your last menstrual period?\"      no    Protocols used: Blood Pressure - High-ADULT-AH    "

## 2024-11-04 NOTE — TELEPHONE ENCOUNTER
"Caller: Mendel Mg \"MARY\"    Relationship to patient: Self    Best call back number: [phone]     Chief complaint: NEW PATIENT - HYPERTENSION AND SURGERY DISCUSSION -     Type of visit: NEW PATIENT    Requested date: ASAP - MORNING PREFERRED     If rescheduling, when is the original appointment: 11/19     Additional notes: PATIENT STATES HE NEEDS TO HAVE SURGERY TO HAVE KIDNEY STONES REMOVED BUT THEY NEED TO LOWER HIS BLOOD PRESSURE FIRST BEFORE THEY CAN APPROVE THE SURGERY. TRIAGE NURSE THAT TRANSFERRED ME THE CALL STATED THE PATIENTS BLOOD PRESSURE /100 WITH NO SYMPTOMS.   "

## 2024-11-15 ENCOUNTER — OFFICE VISIT (OUTPATIENT)
Dept: INTERNAL MEDICINE | Age: 54
End: 2024-11-15
Payer: COMMERCIAL

## 2024-11-15 VITALS
TEMPERATURE: 97.3 F | WEIGHT: 177.6 LBS | HEART RATE: 78 BPM | HEIGHT: 69 IN | RESPIRATION RATE: 18 BRPM | DIASTOLIC BLOOD PRESSURE: 102 MMHG | SYSTOLIC BLOOD PRESSURE: 142 MMHG | OXYGEN SATURATION: 98 % | BODY MASS INDEX: 26.31 KG/M2

## 2024-11-15 DIAGNOSIS — Z13.220 SCREENING FOR LIPID DISORDERS: ICD-10-CM

## 2024-11-15 DIAGNOSIS — Z76.89 ENCOUNTER TO ESTABLISH CARE: Primary | ICD-10-CM

## 2024-11-15 DIAGNOSIS — N21.0 BLADDER STONES: ICD-10-CM

## 2024-11-15 DIAGNOSIS — E55.9 VITAMIN D DEFICIENCY: ICD-10-CM

## 2024-11-15 DIAGNOSIS — Z01.89 ROUTINE LAB DRAW: ICD-10-CM

## 2024-11-15 DIAGNOSIS — Z94.0 HISTORY OF KIDNEY TRANSPLANT: ICD-10-CM

## 2024-11-15 DIAGNOSIS — R53.83 OTHER FATIGUE: ICD-10-CM

## 2024-11-15 DIAGNOSIS — Z12.5 SCREENING FOR PROSTATE CANCER: ICD-10-CM

## 2024-11-15 DIAGNOSIS — I10 PRIMARY HYPERTENSION: ICD-10-CM

## 2024-11-15 RX ORDER — AMLODIPINE BESYLATE 5 MG/1
5 TABLET ORAL DAILY
Qty: 30 TABLET | Refills: 2 | Status: SHIPPED | OUTPATIENT
Start: 2024-11-15

## 2024-11-15 RX ORDER — LISINOPRIL 20 MG/1
20 TABLET ORAL DAILY
Qty: 30 TABLET | Refills: 2 | Status: SHIPPED | OUTPATIENT
Start: 2024-11-15

## 2024-11-15 NOTE — ASSESSMENT & PLAN NOTE
He does report urinary hesitancy and occasionally difficulty urinating. He is planned to have procedure next week.   Continue with plan of care per urologist.  Remains on Flomax 0.4 mg daily.

## 2024-11-15 NOTE — PROGRESS NOTES
"Chief Complaint  Establish Care (54 year old male here today to establish care. He is a right Kidney recipient from 1993 in [place]. States he has large bladder stones that will require surgery, however his blood pressure has been elevated and he needs to get this under control before they will do the procedure. )    History of Present Illness  SUBJECTIVE  Mendel Mg presents to [facility] to establish care.    PSA-needs  Colon cancer uptqfkims-mygua-boduwjbd at this time.  Flu vaccine-declines  COVID-19 vaccine-declines  Lipids-will order  Tobacco use-denies use  Alcohol-occasionally  Recommend regular dental/eye exams, regular exercise, healthy diet.    Patient is scheduled to have bladder stone procedure but needs BP under control    Past Medical History:   Diagnosis Date    Carpal tunnel syndrome     Lt/rt wrists    Hypertension       Family History   Problem Relation Age of Onset    Heart failure Mother     Hypertension Mother     Heart failure Father     Hypertension Father       Past Surgical History:   Procedure Laterality Date    BLADDER SURGERY  10/01/1975    specifics unknown    CARPAL TUNNEL RELEASE Bilateral 04/01/2021    FOREARM FRACTURE SURGERY Right 04/26/2024    TRANSPLANTATION RENAL Right 06/03/1993        Current Outpatient Medications:     tamsulosin (FLOMAX) 0.4 MG capsule 24 hr capsule, Take 1 capsule by mouth Daily., Disp: 30 capsule, Rfl: 0    amLODIPine (NORVASC) 5 MG tablet, Take 1 tablet by mouth Daily., Disp: 30 tablet, Rfl: 2    lisinopril (PRINIVIL,ZESTRIL) 20 MG tablet, Take 1 tablet by mouth Daily., Disp: 30 tablet, Rfl: 2    OBJECTIVE  Vital Signs:   BP (!) 142/102 (BP Location: Left arm, Patient Position: Sitting)   Pulse 78   Temp 97.3 °F (36.3 °C) (Temporal)   Resp 18   Ht 175.3 cm (69\")   Wt 80.6 kg (177 lb 9.6 oz)   SpO2 98%   BMI 26.23 kg/m²    Estimated body mass index is 26.23 kg/m² as calculated from the following:    " "Height as of this encounter: 175.3 cm (69\").    Weight as of this encounter: 80.6 kg (177 lb 9.6 oz).     Wt Readings from Last 3 Encounters:   11/15/24 80.6 kg (177 lb 9.6 oz)   10/24/24 78.1 kg (172 lb 2.9 oz)   03/28/24 76.7 kg (169 lb)     BP Readings from Last 3 Encounters:   11/15/24 (!) 142/102   10/24/24 161/94   03/28/24 147/99       Physical Exam  Vitals and nursing note reviewed.   Constitutional:       Appearance: Normal appearance.   HENT:      Head: Normocephalic.   Eyes:      Extraocular Movements: Extraocular movements intact.      Conjunctiva/sclera: Conjunctivae normal.   Cardiovascular:      Rate and Rhythm: Normal rate and regular rhythm.      Heart sounds: Normal heart sounds. No murmur heard.  Pulmonary:      Effort: Pulmonary effort is normal.      Breath sounds: Normal breath sounds. No wheezing or rales.   Abdominal:      General: Bowel sounds are normal.      Palpations: Abdomen is soft.      Tenderness: There is no abdominal tenderness. There is no guarding.   Musculoskeletal:         General: No swelling. Normal range of motion.      Cervical back: Normal range of motion and neck supple.   Skin:     General: Skin is warm and dry.   Neurological:      General: No focal deficit present.      Mental Status: He is alert and oriented to person, place, and time. Mental status is at baseline.   Psychiatric:         Mood and Affect: Mood normal.         Behavior: Behavior normal.         Thought Content: Thought content normal.         Judgment: Judgment normal.          Result Review        CT Abdomen Pelvis Without Contrast    Result Date: 10/24/2024  1. Transplant kidney demonstrates no evidence of obstructive uropathy. Small amount of perinephric fluid along the transplant kidney is nonspecific and may be postsurgical in nature. 2. Stones are noted within the bladder. No obvious inflammatory change of the bladder wall noted. Please correlate with urinalysis and renal function 3. Severe " atrophy of the native kidneys bilaterally 4. Noncalcified nodule at the right lung base. Optional follow-up could always be considered in the high risk patient at 12 months Electronically Signed: Jorge Tariq MD  10/24/2024 11:13 AM EDT  Workstation ID: [id number]       The above data has been reviewed by KEVAN Ramon 11/15/2024 07:44 EST.          Patient Care Team:  Liz Beavers APRN as PCP - General (Internal Medicine)  Cecil Bell MD (Orthopedic Surgery)    BMI is >= 25 and <30. (Overweight) The following options were offered after discussion;: exercise counseling/recommendations and nutrition counseling/recommendations       ASSESSMENT & PLAN    Diagnoses and all orders for this visit:    1. Encounter to establish care (Primary)    2. History of kidney transplant  Overview:  in [place]/ living donor from sister in [place] , [place]/ off immunosupresive drugs for 20 years    Assessment & Plan:  With a history of end-stage renal disease, status post kidney transplant several years ago.  He states he has been off immunosuppressants for 15 to 20 years.  He states that he feels much better without them.  He was recently seen in the emergency department due to hematuria and was found to have large bladder stones.  He is supposed to have a procedure for that next week but his blood pressure was uncontrolled.  Recommend routine monitoring with nephrologist.      3. Bladder stones  Assessment & Plan:  He does report urinary hesitancy and occasionally difficulty urinating. He is planned to have procedure next week.   Continue with plan of care per urologist.  Remains on Flomax 0.4 mg daily.        4. Primary hypertension  Assessment & Plan:  Patient has notices that his blood pressure has been creeping up over the last several months.  He states that his dad had some amlodipine leftover and he has been taking amlodipine 5 mg last several days.  His blood pressure remains elevated.  Blood pressure today in the office  142/102.  He has been on several medications in the past.  He tolerated lisinopril well deviously.  Will start lisinopril and continue with the amlodipine.  Recommend routine blood pressure monitoring and to keep a log.  Will follow-up in 2 weeks or sooner if needed.      Orders:  -     CBC w AUTO Differential; Future  -     lisinopril (PRINIVIL,ZESTRIL) 20 MG tablet; Take 1 tablet by mouth Daily.  Dispense: 30 tablet; Refill: 2  -     amLODIPine (NORVASC) 5 MG tablet; Take 1 tablet by mouth Daily.  Dispense: 30 tablet; Refill: 2  -     Comprehensive metabolic panel; Future    5. Screening for prostate cancer  -     PSA SCREENING; Future    6. Screening for lipid disorders  -     Lipid panel; Future    7. Routine lab draw  -     TSH+Free T4; Future    8. Vitamin D deficiency  -     Vitamin D 25 hydroxy; Future    9. Other fatigue  -     Testosterone, Free, Total; Future  -     Vitamin B12; Future  -     Folate; Future         Tobacco Use: Medium Risk (11/15/2024)    Patient History     Smoking Tobacco Use: Former     Smokeless Tobacco Use: Never     Passive Exposure: Not on file       Follow Up     Return in about 2 weeks (around 11/29/2024) for Annual physical.    Please note that portions of this note were completed with a voice recognition program.    Patient was given instructions and counseling regarding his condition or for health maintenance advice. Please see specific information pulled into the AVS if appropriate.   I have reviewed information obtained and documented by others and I have confirmed the accuracy of this documented note.    KEVAN Ramon

## 2024-11-15 NOTE — ASSESSMENT & PLAN NOTE
With a history of end-stage renal disease, status post kidney transplant several years ago.  He states he has been off immunosuppressants for 15 to 20 years.  He states that he feels much better without them.  He was recently seen in the emergency department due to hematuria and was found to have large bladder stones.  He is supposed to have a procedure for that next week but his blood pressure was uncontrolled.  Recommend routine monitoring with nephrologist.

## 2024-11-15 NOTE — ASSESSMENT & PLAN NOTE
Patient has notices that his blood pressure has been creeping up over the last several months.  He states that his dad had some amlodipine leftover and he has been taking amlodipine 5 mg last several days.  His blood pressure remains elevated.  Blood pressure today in the office 142/102.  He has been on several medications in the past.  He tolerated lisinopril well deviously.  Will start lisinopril and continue with the amlodipine.  Recommend routine blood pressure monitoring and to keep a log.  Will follow-up in 2 weeks or sooner if needed.

## 2024-11-17 ENCOUNTER — PATIENT MESSAGE (OUTPATIENT)
Dept: INTERNAL MEDICINE | Age: 54
End: 2024-11-17
Payer: COMMERCIAL

## 2024-11-17 DIAGNOSIS — R00.0 TACHYCARDIA: Primary | ICD-10-CM

## 2024-11-18 ENCOUNTER — LAB (OUTPATIENT)
Dept: INTERNAL MEDICINE | Age: 54
End: 2024-11-18
Payer: COMMERCIAL

## 2024-11-18 DIAGNOSIS — Z01.89 ROUTINE LAB DRAW: ICD-10-CM

## 2024-11-18 DIAGNOSIS — E55.9 VITAMIN D DEFICIENCY: ICD-10-CM

## 2024-11-18 DIAGNOSIS — R53.83 OTHER FATIGUE: ICD-10-CM

## 2024-11-18 DIAGNOSIS — Z13.220 SCREENING FOR LIPID DISORDERS: ICD-10-CM

## 2024-11-18 DIAGNOSIS — I10 PRIMARY HYPERTENSION: ICD-10-CM

## 2024-11-18 DIAGNOSIS — Z12.5 SCREENING FOR PROSTATE CANCER: ICD-10-CM

## 2024-11-18 LAB
25(OH)D3 SERPL-MCNC: 29.5 NG/ML (ref 30–100)
ALBUMIN SERPL-MCNC: 4.5 G/DL (ref 3.5–5.2)
ALBUMIN/GLOB SERPL: 1.7 G/DL
ALP SERPL-CCNC: 63 U/L (ref 39–117)
ALT SERPL W P-5'-P-CCNC: 29 U/L (ref 1–41)
ANION GAP SERPL CALCULATED.3IONS-SCNC: 11 MMOL/L (ref 5–15)
AST SERPL-CCNC: 19 U/L (ref 1–40)
BASOPHILS # BLD AUTO: 0.04 10*3/MM3 (ref 0–0.2)
BASOPHILS NFR BLD AUTO: 0.6 % (ref 0–1.5)
BILIRUB SERPL-MCNC: 0.6 MG/DL (ref 0–1.2)
BUN SERPL-MCNC: 14 MG/DL (ref 6–20)
BUN/CREAT SERPL: 12.5 (ref 7–25)
CALCIUM SPEC-SCNC: 10.1 MG/DL (ref 8.6–10.5)
CHLORIDE SERPL-SCNC: 102 MMOL/L (ref 98–107)
CHOLEST SERPL-MCNC: 248 MG/DL (ref 0–200)
CO2 SERPL-SCNC: 24 MMOL/L (ref 22–29)
CREAT SERPL-MCNC: 1.12 MG/DL (ref 0.76–1.27)
DEPRECATED RDW RBC AUTO: 42.4 FL (ref 37–54)
EGFRCR SERPLBLD CKD-EPI 2021: 79 ML/MIN/1.73
EOSINOPHIL # BLD AUTO: 0.14 10*3/MM3 (ref 0–0.4)
EOSINOPHIL NFR BLD AUTO: 2.1 % (ref 0.3–6.2)
ERYTHROCYTE [DISTWIDTH] IN BLOOD BY AUTOMATED COUNT: 13.5 % (ref 12.3–15.4)
FOLATE SERPL-MCNC: 9.8 NG/ML (ref 4.78–24.2)
GLOBULIN UR ELPH-MCNC: 2.7 GM/DL
GLUCOSE SERPL-MCNC: 94 MG/DL (ref 65–99)
HCT VFR BLD AUTO: 48.3 % (ref 37.5–51)
HDLC SERPL-MCNC: 37 MG/DL (ref 40–60)
HGB BLD-MCNC: 17.2 G/DL (ref 13–17.7)
IMM GRANULOCYTES # BLD AUTO: 0.03 10*3/MM3 (ref 0–0.05)
IMM GRANULOCYTES NFR BLD AUTO: 0.5 % (ref 0–0.5)
LDLC SERPL CALC-MCNC: 193 MG/DL (ref 0–100)
LDLC/HDLC SERPL: 5.17 {RATIO}
LYMPHOCYTES # BLD AUTO: 1.48 10*3/MM3 (ref 0.7–3.1)
LYMPHOCYTES NFR BLD AUTO: 22.6 % (ref 19.6–45.3)
MCH RBC QN AUTO: 30.7 PG (ref 26.6–33)
MCHC RBC AUTO-ENTMCNC: 35.6 G/DL (ref 31.5–35.7)
MCV RBC AUTO: 86.1 FL (ref 79–97)
MONOCYTES # BLD AUTO: 0.47 10*3/MM3 (ref 0.1–0.9)
MONOCYTES NFR BLD AUTO: 7.2 % (ref 5–12)
NEUTROPHILS NFR BLD AUTO: 4.4 10*3/MM3 (ref 1.7–7)
NEUTROPHILS NFR BLD AUTO: 67 % (ref 42.7–76)
NRBC BLD AUTO-RTO: 0 /100 WBC (ref 0–0.2)
PLATELET # BLD AUTO: 273 10*3/MM3 (ref 140–450)
PMV BLD AUTO: 9.9 FL (ref 6–12)
POTASSIUM SERPL-SCNC: 4.3 MMOL/L (ref 3.5–5.2)
PROT SERPL-MCNC: 7.2 G/DL (ref 6–8.5)
PSA SERPL-MCNC: 0.7 NG/ML (ref 0–4)
RBC # BLD AUTO: 5.61 10*6/MM3 (ref 4.14–5.8)
SODIUM SERPL-SCNC: 137 MMOL/L (ref 136–145)
T4 FREE SERPL-MCNC: 1.7 NG/DL (ref 0.92–1.68)
TRIGL SERPL-MCNC: 99 MG/DL (ref 0–150)
TSH SERPL DL<=0.05 MIU/L-ACNC: 2.55 UIU/ML (ref 0.27–4.2)
VIT B12 BLD-MCNC: 975 PG/ML (ref 211–946)
VLDLC SERPL-MCNC: 18 MG/DL (ref 5–40)
WBC NRBC COR # BLD AUTO: 6.56 10*3/MM3 (ref 3.4–10.8)

## 2024-11-18 PROCEDURE — 82607 VITAMIN B-12: CPT

## 2024-11-18 PROCEDURE — 80061 LIPID PANEL: CPT

## 2024-11-18 PROCEDURE — 80050 GENERAL HEALTH PANEL: CPT

## 2024-11-18 PROCEDURE — 84402 ASSAY OF FREE TESTOSTERONE: CPT

## 2024-11-18 PROCEDURE — 84439 ASSAY OF FREE THYROXINE: CPT

## 2024-11-18 PROCEDURE — 36415 COLL VENOUS BLD VENIPUNCTURE: CPT

## 2024-11-18 PROCEDURE — 84403 ASSAY OF TOTAL TESTOSTERONE: CPT

## 2024-11-18 PROCEDURE — G0103 PSA SCREENING: HCPCS

## 2024-11-18 PROCEDURE — 82746 ASSAY OF FOLIC ACID SERUM: CPT

## 2024-11-18 PROCEDURE — 82306 VITAMIN D 25 HYDROXY: CPT

## 2024-11-18 NOTE — TELEPHONE ENCOUNTER
I'm confused. Can we call him and see what exactly he is taking. He may have too much meds. May need to cut lisinopril in half. Also I am going to get a holter monitor.

## 2024-11-19 ENCOUNTER — TELEPHONE (OUTPATIENT)
Dept: INTERNAL MEDICINE | Age: 54
End: 2024-11-19

## 2024-11-19 RX ORDER — PROPRANOLOL HCL 20 MG
20 TABLET ORAL 2 TIMES DAILY
Qty: 60 TABLET | Refills: 5 | Status: SHIPPED | OUTPATIENT
Start: 2024-11-19

## 2024-11-19 NOTE — TELEPHONE ENCOUNTER
"Caller: Mendel Mg \"MARY\"    Relationship: Self    Best call back number:     [phone]        What medication are you requesting: PROPANOLOL 20 MG TWICE     Have you had these symptoms before:    [x] Yes  [] No    Have you been treated for these symptoms before:   [x] Yes  [] No    If a prescription is needed, what is your preferred pharmacy and phone number: [facility] #[id number] - [place], [place] - [address] [facility] [phone] [facility] [phone] FX     Additional notes: PATIENT STATES THAT NERY TOLD HIM TO CONTINUE TAKING THIS BUT HE DOES NOT HAVE A PRESCRIPTION FOR IT.     PATIENT STATES THAT THIS HAS BEEN HELPING HIM KEEP HIS HEART RATE DOWN. PATIENT STATES THAT SOMETIMES ONE 20 MG PILL IN THE MORNING WILL WORK AND SOMETIMES HE HAS TO TAKE A SECOND ONE IN THE EVENING.   "

## 2024-11-21 LAB
TESTOST FREE SERPL-MCNC: 9.3 PG/ML (ref 7.2–24)
TESTOST SERPL-MCNC: 800 NG/DL (ref 264–916)

## 2024-12-02 ENCOUNTER — OFFICE VISIT (OUTPATIENT)
Dept: INTERNAL MEDICINE | Age: 54
End: 2024-12-02
Payer: COMMERCIAL

## 2024-12-02 VITALS
TEMPERATURE: 97.8 F | BODY MASS INDEX: 26.96 KG/M2 | HEART RATE: 58 BPM | RESPIRATION RATE: 18 BRPM | HEIGHT: 69 IN | DIASTOLIC BLOOD PRESSURE: 94 MMHG | OXYGEN SATURATION: 98 % | WEIGHT: 182 LBS | SYSTOLIC BLOOD PRESSURE: 152 MMHG

## 2024-12-02 DIAGNOSIS — I10 PRIMARY HYPERTENSION: ICD-10-CM

## 2024-12-02 DIAGNOSIS — E55.9 VITAMIN D DEFICIENCY: ICD-10-CM

## 2024-12-02 DIAGNOSIS — E78.2 MIXED HYPERLIPIDEMIA: ICD-10-CM

## 2024-12-02 DIAGNOSIS — R79.89 ELEVATED SERUM FREE T4 LEVEL: ICD-10-CM

## 2024-12-02 DIAGNOSIS — Z00.00 ANNUAL PHYSICAL EXAM: Primary | ICD-10-CM

## 2024-12-02 PROCEDURE — 99396 PREV VISIT EST AGE 40-64: CPT

## 2024-12-02 RX ORDER — PROPRANOLOL HYDROCHLORIDE 60 MG/1
60 CAPSULE, EXTENDED RELEASE ORAL DAILY
Qty: 90 CAPSULE | Refills: 1 | Status: SHIPPED | OUTPATIENT
Start: 2024-12-02

## 2024-12-02 NOTE — ASSESSMENT & PLAN NOTE
12/2/2024-Blood pressure typically has been better controlled at home. He has not taken his medications yet today. He is currently taking amlodipine 5 mg daily and propranolol 20 mg BID. He was on lisinopril; however, he was becoming hypotensive and he was tachycardic so he stopped that.  He feels better on the beta blocker but feels like it wears off.  We will bump up the dose and utilize the extended release.  Continue amlodipine 5 mg daily.   Monitor BP at home and report elevated readings.    11/15/2024-Patient has notices that his blood pressure has been creeping up over the last several months.  He states that his dad had some amlodipine leftover and he has been taking amlodipine 5 mg last several days.  His blood pressure remains elevated.  Blood pressure today in the office 142/102.  He has been on several medications in the past.  He tolerated lisinopril well deviously.  Will start lisinopril and continue with the amlodipine.  Recommend routine blood pressure monitoring and to keep a log.  Will follow-up in 2 weeks or sooner if needed.

## 2024-12-02 NOTE — PROGRESS NOTES
"Chief Complaint  Annual Exam (54 year old male here today for his annual CPE and lab review. States he has still been having high readings. He brought his blood pressure readings. ) and Bladder Stone (He had Bladder Stone removal at [facility] on Nov 20, 2024 with Dr Esha Reyes. States he has been doing well since the procedure. States he is now able to urinate like normal. )    History of Present Illness  SUBJECTIVE  Mendel Mg presents to [facility] for his annual physical exam.     He is doing well overall.    He denies any chest pain ,soa, palpitations, nausea, vomiting, diarrhea, changes to bowel/bladder habits.     Past Medical History:   Diagnosis Date    Carpal tunnel syndrome     Lt/rt wrists    Hypertension       Family History   Problem Relation Age of Onset    Heart failure Mother     Hypertension Mother     Heart failure Father     Hypertension Father       Past Surgical History:   Procedure Laterality Date    BLADDER SURGERY  10/01/1975    specifics unknown    CARPAL TUNNEL RELEASE Bilateral 04/01/2021    FOREARM FRACTURE SURGERY Right 04/26/2024    TRANSPLANTATION RENAL Right 06/03/1993        Current Outpatient Medications:     amLODIPine (NORVASC) 5 MG tablet, Take 1 tablet by mouth Daily., Disp: 30 tablet, Rfl: 2    tamsulosin (FLOMAX) 0.4 MG capsule 24 hr capsule, Take 1 capsule by mouth Daily., Disp: 30 capsule, Rfl: 0    lisinopril (PRINIVIL,ZESTRIL) 20 MG tablet, Take 1 tablet by mouth Daily. (Patient not taking: Reported on 12/2/2024), Disp: 30 tablet, Rfl: 2    propranolol LA (INDERAL LA) 60 MG 24 hr capsule, Take 1 capsule by mouth Daily., Disp: 90 capsule, Rfl: 1    OBJECTIVE  Vital Signs:   /94 (BP Location: Left arm, Patient Position: Sitting)   Pulse 58   Temp 97.8 °F (36.6 °C) (Temporal)   Resp 18   Ht 175.3 cm (69\")   Wt 82.6 kg (182 lb)   SpO2 98%   BMI 26.88 kg/m²    Estimated body mass index is 26.88 kg/m² as calculated from the " "following:    Height as of this encounter: 175.3 cm (69\").    Weight as of this encounter: 82.6 kg (182 lb).     Wt Readings from Last 3 Encounters:   12/02/24 82.6 kg (182 lb)   11/15/24 80.6 kg (177 lb 9.6 oz)   10/24/24 78.1 kg (172 lb 2.9 oz)     BP Readings from Last 3 Encounters:   12/02/24 152/94   11/15/24 (!) 142/102   10/24/24 161/94       Physical Exam  Vitals and nursing note reviewed.   Constitutional:       Appearance: Normal appearance.   HENT:      Head: Normocephalic.      Right Ear: Tympanic membrane normal.      Left Ear: Tympanic membrane normal.   Eyes:      Extraocular Movements: Extraocular movements intact.      Conjunctiva/sclera: Conjunctivae normal.   Cardiovascular:      Rate and Rhythm: Normal rate and regular rhythm.      Heart sounds: Normal heart sounds. No murmur heard.  Pulmonary:      Effort: Pulmonary effort is normal.      Breath sounds: Normal breath sounds. No wheezing or rales.   Abdominal:      General: Bowel sounds are normal.      Palpations: Abdomen is soft.      Tenderness: There is no abdominal tenderness. There is no guarding.   Musculoskeletal:         General: No swelling. Normal range of motion.      Cervical back: Normal range of motion and neck supple.   Skin:     General: Skin is warm and dry.   Neurological:      General: No focal deficit present.      Mental Status: He is alert and oriented to person, place, and time. Mental status is at baseline.   Psychiatric:         Mood and Affect: Mood normal.         Behavior: Behavior normal.         Thought Content: Thought content normal.         Judgment: Judgment normal.          Result Review    CMP          10/24/2024    10:03 11/18/2024    08:21   CMP   Glucose 111  94    BUN 14  14    Creatinine 1.16  1.12    EGFR 75.8  79.0    Sodium 139  137    Potassium 4.4  4.3    Chloride 104  102    Calcium 9.7  10.1    Total Protein 7.4  7.2    Albumin 4.4  4.5    Globulin 3.0  2.7    Total Bilirubin 0.5  0.6    Alkaline " "Phosphatase 56  63    AST (SGOT) 15  19    ALT (SGPT) 20  29    Albumin/Globulin Ratio 1.5  1.7    BUN/Creatinine Ratio 12.1  12.5    Anion Gap 9.2  11.0      CBC w/diff          10/24/2024    10:03 11/18/2024    08:21   CBC w/Diff   WBC 6.23  6.56    RBC 5.68  5.61    Hemoglobin 17.2  17.2    Hematocrit 49.1  48.3    MCV 86.4  86.1    MCH 30.3  30.7    MCHC 35.0  35.6    RDW 13.0  13.5    Platelets 289  273    Neutrophil Rel % 78.0  67.0    Immature Granulocyte Rel % 0.3  0.5    Lymphocyte Rel % 17.2  22.6    Monocyte Rel % 3.2  7.2    Eosinophil Rel % 1.0  2.1    Basophil Rel % 0.3  0.6      Lipid Panel          11/18/2024    08:21   Lipid Panel   Total Cholesterol 248    Triglycerides 99    HDL Cholesterol 37    VLDL Cholesterol 18    LDL Cholesterol  193    LDL/HDL Ratio 5.17      TSH          11/18/2024    08:21   TSH   TSH 2.550        No components found for: \"RJTT28LU\"  No components found for: \"UQYDCNDA84\"  PSA          11/18/2024    08:21   PSA   PSA 0.701            The above data has been reviewed by KEVAN Ramon 12/02/2024 08:13 EST.          Patient Care Team:  Liz Beavers APRN as PCP - General (Internal Medicine)  Cecil Bell MD (Orthopedic Surgery)            ASSESSMENT & PLAN    Diagnoses and all orders for this visit:    1. Annual physical exam (Primary)  Assessment & Plan:  PSA-negative, 11/2024  Colon cancer catlcspqj-kskps-bgrmxfhp at this time.  Flu vaccine-declines  COVID-19 vaccine-declines  Lipids-UTD  Tobacco use-denies use  Alcohol-occasionally  Recommend regular dental/eye exams, regular exercise, healthy diet.      2. Primary hypertension  Assessment & Plan:  12/2/2024-Blood pressure typically has been better controlled at home. He has not taken his medications yet today. He is currently taking amlodipine 5 mg daily and propranolol 20 mg BID. He was on lisinopril; however, he was becoming hypotensive and he was tachycardic so he stopped that.  He feels better on the beta " blocker but feels like it wears off.  We will bump up the dose and utilize the extended release.  Continue amlodipine 5 mg daily.   Monitor BP at home and report elevated readings.    11/15/2024-Patient has notices that his blood pressure has been creeping up over the last several months.  He states that his dad had some amlodipine leftover and he has been taking amlodipine 5 mg last several days.  His blood pressure remains elevated.  Blood pressure today in the office 142/102.  He has been on several medications in the past.  He tolerated lisinopril well deviously.  Will start lisinopril and continue with the amlodipine.  Recommend routine blood pressure monitoring and to keep a log.  Will follow-up in 2 weeks or sooner if needed.      Orders:  -     Comprehensive metabolic panel; Future  -     Lipid panel; Future  -     Uric acid; Future    3. Mixed hyperlipidemia  Assessment & Plan:  The 10-year ASCVD risk score (Lit BANSAL, et al., 2019) is: 11.7%    Values used to calculate the score:      Age: 54 years      Sex: Male      Is Non- : No      Diabetic: No      Tobacco smoker: No      Systolic Blood Pressure: 152 mmHg      Is BP treated: Yes      HDL Cholesterol: 37 mg/dL      Total Cholesterol: 248 mg/dL    Discussed this with the patient along with goal percentage/ldl/hdl. He declines pharmacological treatment at this time.   He states he will make lifestyle changes to bring these numbers down.   He will also start fish oil supplement.   Will reevaluate in 3 months time.     Orders:  -     Comprehensive metabolic panel; Future  -     Lipid panel; Future    4. Vitamin D deficiency  Comments:  start OTC vit D supplementation    5. Elevated serum free T4 level  -     TSH+Free T4; Future    Other orders  -     propranolol LA (INDERAL LA) 60 MG 24 hr capsule; Take 1 capsule by mouth Daily.  Dispense: 90 capsule; Refill: 1         Tobacco Use: Medium Risk (12/2/2024)    Patient History      Smoking Tobacco Use: Former     Smokeless Tobacco Use: Never     Passive Exposure: Not on file       Follow Up     Return in about 3 months (around 3/2/2025) for Recheck.    Please note that portions of this note were completed with a voice recognition program.    Patient was given instructions and counseling regarding his condition or for health maintenance advice. Please see specific information pulled into the AVS if appropriate.   I have reviewed information obtained and documented by others and I have confirmed the accuracy of this documented note.    KEVAN Ramon

## 2024-12-02 NOTE — ASSESSMENT & PLAN NOTE
The 10-year ASCVD risk score (Lit BANSAL, et al., 2019) is: 11.7%    Values used to calculate the score:      Age: 54 years      Sex: Male      Is Non- : No      Diabetic: No      Tobacco smoker: No      Systolic Blood Pressure: 152 mmHg      Is BP treated: Yes      HDL Cholesterol: 37 mg/dL      Total Cholesterol: 248 mg/dL    Discussed this with the patient along with goal percentage/ldl/hdl. He declines pharmacological treatment at this time.   He states he will make lifestyle changes to bring these numbers down.   He will also start fish oil supplement.   Will reevaluate in 3 months time.

## 2024-12-02 NOTE — ASSESSMENT & PLAN NOTE
PSA-negative, 11/2024  Colon cancer vuuxlyqrt-lorah-tregexaa at this time.  Flu vaccine-declines  COVID-19 vaccine-declines  Lipids-UTD  Tobacco use-denies use  Alcohol-occasionally  Recommend regular dental/eye exams, regular exercise, healthy diet.

## 2024-12-10 ENCOUNTER — TRANSCRIBE ORDERS (OUTPATIENT)
Dept: ADMINISTRATIVE | Facility: HOSPITAL | Age: 54
End: 2024-12-10
Payer: COMMERCIAL

## 2024-12-10 DIAGNOSIS — Z94.0 TRANSPLANTED KIDNEY: Primary | ICD-10-CM

## 2024-12-18 ENCOUNTER — HOSPITAL ENCOUNTER (OUTPATIENT)
Dept: ULTRASOUND IMAGING | Facility: HOSPITAL | Age: 54
Discharge: HOME | End: 2024-12-18
Admitting: STUDENT IN AN ORGANIZED HEALTH CARE EDUCATION/TRAINING PROGRAM
Payer: COMMERCIAL

## 2024-12-18 DIAGNOSIS — Z94.0 TRANSPLANTED KIDNEY: ICD-10-CM

## 2024-12-18 PROCEDURE — 76775 US EXAM ABDO BACK WALL LIM: CPT

## 2025-02-14 DIAGNOSIS — I10 PRIMARY HYPERTENSION: ICD-10-CM

## 2025-02-14 RX ORDER — AMLODIPINE BESYLATE 5 MG/1
5 TABLET ORAL DAILY
Qty: 30 TABLET | Refills: 2 | Status: SHIPPED | OUTPATIENT
Start: 2025-02-14

## 2025-02-14 RX ORDER — LISINOPRIL 20 MG/1
20 TABLET ORAL DAILY
Qty: 30 TABLET | Refills: 2 | Status: SHIPPED | OUTPATIENT
Start: 2025-02-14

## 2025-02-14 NOTE — TELEPHONE ENCOUNTER
Rx Refill Note  Requested Prescriptions     Pending Prescriptions Disp Refills    lisinopril (PRINIVIL,ZESTRIL) 20 MG tablet [Pharmacy Med Name: LISINOPRIL 20 MG TABLET] 30 tablet 2     Sig: TAKE 1 TABLET BY MOUTH EVERY DAY    amLODIPine (NORVASC) 5 MG tablet [Pharmacy Med Name: AMLODIPINE BESYLATE 5 MG TAB] 30 tablet 2     Sig: TAKE 1 TABLET BY MOUTH EVERY DAY      Last office visit with prescribing clinician: 12/2/2024   Last telemedicine visit with prescribing clinician: Visit date not found   Next office visit with prescribing clinician: 3/3/2025                         Would you like a call back once the refill request has been completed: [] Yes [] No    If the office needs to give you a call back, can they leave a voicemail: [] Yes [] No    Mari Main MA  02/14/25, 07:23 EST

## 2025-03-03 ENCOUNTER — OFFICE VISIT (OUTPATIENT)
Dept: INTERNAL MEDICINE | Age: 55
End: 2025-03-03
Payer: COMMERCIAL

## 2025-03-03 VITALS
HEART RATE: 67 BPM | SYSTOLIC BLOOD PRESSURE: 122 MMHG | BODY MASS INDEX: 29.62 KG/M2 | RESPIRATION RATE: 18 BRPM | WEIGHT: 200 LBS | OXYGEN SATURATION: 97 % | HEIGHT: 69 IN | TEMPERATURE: 97.7 F | DIASTOLIC BLOOD PRESSURE: 82 MMHG

## 2025-03-03 DIAGNOSIS — G47.19 EXCESSIVE DAYTIME SLEEPINESS: ICD-10-CM

## 2025-03-03 DIAGNOSIS — E55.9 VITAMIN D DEFICIENCY: ICD-10-CM

## 2025-03-03 DIAGNOSIS — I10 PRIMARY HYPERTENSION: ICD-10-CM

## 2025-03-03 DIAGNOSIS — R06.83 SNORING: ICD-10-CM

## 2025-03-03 DIAGNOSIS — R53.83 FATIGUE, UNSPECIFIED TYPE: ICD-10-CM

## 2025-03-03 DIAGNOSIS — R53.83 OTHER FATIGUE: ICD-10-CM

## 2025-03-03 DIAGNOSIS — E78.2 MIXED HYPERLIPIDEMIA: Primary | ICD-10-CM

## 2025-03-03 PROCEDURE — 99214 OFFICE O/P EST MOD 30 MIN: CPT

## 2025-03-03 RX ORDER — PILOCARPINE HYDROCHLORIDE 12.5 MG/ML
1 SOLUTION/ DROPS OPHTHALMIC DAILY
COMMUNITY
Start: 2024-12-13

## 2025-03-03 NOTE — ASSESSMENT & PLAN NOTE
Order placed for sleep study.  Discussed lifestyle modifications to include healthy diet, regular exercise, weight loss with patient.  Lab orders placed.

## 2025-03-03 NOTE — ASSESSMENT & PLAN NOTE
Lab orders placed.    Discussed dietary/lifestyle modifications with patient to include regular exercise and healthy diet.

## 2025-03-03 NOTE — PROGRESS NOTES
Chief Complaint  Primary Care Follow-Up (54 year old male here today for a 3 month follow up. He is due for labs and has orders pended. States he has been eating really bad lately.)    History of Present Illness  SUBJECTIVE  Mendel Mg presents to [facility]     History of Present Illness  The patient presents for evaluation of hyperlipidemia, hypertension, sleep issues, and fatigue.    He has a history of renal calculi and acknowledges the need for lifestyle modifications, including regular exercise and a balanced diet. He is not currently on any lipid-lowering medications.     During his last visit, he experienced tachycardia, which has since been managed. He attributes this improvement to a significant reduction in his caffeine intake, as he was previously consuming coffee throughout the day. He has a home blood pressure monitor for regular use. His current medication regimen includes propranolol 60 mg extended release and amlodipine, both taken once daily, although he admits to occasionally taking them every other day.He reports no chest pain, dyspnea, or palpitations.    He reports poor sleep quality, which he believes is due to unhealthy eating and sleeping habits. He experiences daytime somnolence, particularly during his long drives, often necessitating frequent stops. He also reports occasional fatigue. He has been informed about the possibility of undergoing a sleep study but has expressed disinterest. He acknowledges snoring but does not believe he experiences apneic episodes during sleep.          Past Medical History:   Diagnosis Date    Carpal tunnel syndrome     Lt/rt wrists    Hypertension       Family History   Problem Relation Age of Onset    Heart failure Mother     Hypertension Mother     Heart failure Father     Hypertension Father       Past Surgical History:   Procedure Laterality Date    BLADDER SURGERY  10/01/1975    specifics unknown    CARPAL  "TUNNEL RELEASE Bilateral 04/01/2021    FOREARM FRACTURE SURGERY Right 04/26/2024    TRANSPLANTATION RENAL Right 06/03/1993        Current Outpatient Medications:     amLODIPine (NORVASC) 5 MG tablet, TAKE 1 TABLET BY MOUTH EVERY DAY, Disp: 30 tablet, Rfl: 2    propranolol LA (INDERAL LA) 60 MG 24 hr capsule, Take 1 capsule by mouth Daily., Disp: 90 capsule, Rfl: 1    Vuity 1.25 %, Administer 1 drop to both eyes Daily., Disp: , Rfl:     OBJECTIVE  Vital Signs:   /82 (BP Location: Left arm, Patient Position: Sitting)   Pulse 67   Temp 97.7 °F (36.5 °C) (Temporal)   Resp 18   Ht 175.3 cm (69\")   Wt 90.7 kg (200 lb)   SpO2 97%   BMI 29.53 kg/m²    Estimated body mass index is 29.53 kg/m² as calculated from the following:    Height as of this encounter: 175.3 cm (69\").    Weight as of this encounter: 90.7 kg (200 lb).     Wt Readings from Last 3 Encounters:   03/03/25 90.7 kg (200 lb)   12/02/24 82.6 kg (182 lb)   11/15/24 80.6 kg (177 lb 9.6 oz)     BP Readings from Last 3 Encounters:   03/03/25 122/82   12/02/24 152/94   11/15/24 (!) 142/102       Physical Exam  Vitals and nursing note reviewed.   Constitutional:       Appearance: Normal appearance.   HENT:      Head: Normocephalic and atraumatic.   Cardiovascular:      Rate and Rhythm: Normal rate and regular rhythm.      Heart sounds: Normal heart sounds.   Pulmonary:      Effort: Pulmonary effort is normal.      Breath sounds: Normal breath sounds.   Abdominal:      General: Abdomen is flat. Bowel sounds are normal.      Palpations: Abdomen is soft.   Musculoskeletal:         General: Normal range of motion.      Cervical back: Normal range of motion.   Skin:     General: Skin is warm and dry.   Neurological:      General: No focal deficit present.      Mental Status: He is alert and oriented to person, place, and time. Mental status is at baseline.   Psychiatric:         Mood and Affect: Mood normal.         Behavior: Behavior normal.         Thought " Content: Thought content normal.         Judgment: Judgment normal.          Result Review        US Renal Limited    Result Date: 12/19/2024  1. Native kidneys are not evaluated on today's study. 2. Right lower quadrant transplant kidney appears grossly unremarkable in appearance. Mild fullness of the collecting system is noted which resolves on post void imaging of the bladder 3. Small to moderate post void residual noted. Bladder otherwise unremarkable in appearance. Electronically Signed: Jorge Tariq MD  12/19/2024 6:48 PM EST  Workstation ID: [id number]       The above data has been reviewed by KEVAN Ramon 03/03/2025 08:57 EST.          Patient Care Team:  Liz Beavers APRN as PCP - General (Internal Medicine)  Cecil Bell MD (Orthopedic Surgery)            ASSESSMENT & PLAN    Diagnoses and all orders for this visit:    1. Mixed hyperlipidemia (Primary)  Assessment & Plan:  Lab orders placed.    Discussed dietary/lifestyle modifications with patient to include regular exercise and healthy diet.    Orders:  -     Vitamin B12 & Folate    2. Primary hypertension  Assessment & Plan:  Blood pressure stable.    Currently taking propranolol 60 mg extended release capsule daily, amlodipine 5 mg tablet daily.  Continue current medication regimen.      Orders:  -     Vitamin B12 & Folate    3. Other fatigue  Assessment & Plan:  Order placed for sleep study.  Discussed lifestyle modifications to include healthy diet, regular exercise, weight loss with patient.  Lab orders placed.    Orders:  -     Home Sleep Study; Future  -     Vitamin B12 & Folate    4. Excessive daytime sleepiness  -     Home Sleep Study; Future    5. Vitamin D deficiency  -     Vitamin D,25-Hydroxy    6. Fatigue, unspecified type  -     Home Sleep Study; Future    7. Snoring  -     Home Sleep Study; Future         Assessment & Plan  1. Hyperlipidemia.  He is not currently on any medication for cholesterol management. He is advised to  maintain a healthy diet, engage in regular physical activity, and limit the intake of fried and fatty foods. A comprehensive lab workup, including lipid profile and uric acid levels, will be conducted to assess his current status. He is instructed to fast prior to the lab work, with the exception of black coffee or water.    2. Hypertension.  His blood pressure is well-regulated today at 122/82 mmHg. He is advised to adhere to his medication schedule and monitor his blood pressure at home, especially if he experiences symptoms such as headaches, dizziness, or blurred vision. He is currently taking propranolol 60 mg extended release and amlodipine, prescribed for once a day but often taken every other day.    3. Suspected sleep apnea.  His weight gain may be contributing to his sleep apnea. He is advised to consider a sleep study to further investigate the cause of his fatigue and daytime sleepiness. A sleep study will be ordered to evaluate the potential presence of sleep apnea. The study can be conducted at home, and insurance coverage will be verified.    4. Fatigue.  Additional lab tests, including vitamin D and B12 levels, will be ordered to investigate potential deficiencies contributing to his fatigue.    Follow-up  The patient will follow up in 4 months.      Tobacco Use: Medium Risk (3/3/2025)    Patient History     Smoking Tobacco Use: Former     Smokeless Tobacco Use: Never     Passive Exposure: Not on file       Follow Up     Return in about 4 months (around 7/3/2025) for Recheck.    Please note that portions of this note were completed with a voice recognition program.    Patient was given instructions and counseling regarding his condition or for health maintenance advice. Please see specific information pulled into the AVS if appropriate.   I have reviewed information obtained and documented by others and I have confirmed the accuracy of this documented note.    KEVAN Ramon    Patient or patient  representative verbalized consent for the use of Ambient Listening during the visit with  KEVAN Ramon for chart documentation. 3/3/2025  09:13 EST

## 2025-03-03 NOTE — ASSESSMENT & PLAN NOTE
Blood pressure stable.    Currently taking propranolol 60 mg extended release capsule daily, amlodipine 5 mg tablet daily.  Continue current medication regimen.

## 2025-04-11 RX ORDER — PILOCARPINE HYDROCHLORIDE 12.5 MG/ML
1 SOLUTION/ DROPS OPHTHALMIC DAILY
Qty: 5 ML | Refills: 1 | Status: SHIPPED | OUTPATIENT
Start: 2025-04-11

## 2025-05-30 RX ORDER — PROPRANOLOL HYDROCHLORIDE 60 MG/1
60 CAPSULE, EXTENDED RELEASE ORAL DAILY
Qty: 90 CAPSULE | Refills: 1 | Status: SHIPPED | OUTPATIENT
Start: 2025-05-30

## 2025-06-05 ENCOUNTER — TELEPHONE (OUTPATIENT)
Dept: INTERNAL MEDICINE | Age: 55
End: 2025-06-05
Payer: COMMERCIAL

## 2025-06-05 DIAGNOSIS — I10 PRIMARY HYPERTENSION: ICD-10-CM

## 2025-06-05 RX ORDER — LISINOPRIL 20 MG/1
20 TABLET ORAL DAILY
Qty: 90 TABLET | Refills: 0 | Status: SHIPPED | OUTPATIENT
Start: 2025-06-05

## 2025-06-05 RX ORDER — AMLODIPINE BESYLATE 5 MG/1
5 TABLET ORAL DAILY
Qty: 90 TABLET | Refills: 0 | Status: SHIPPED | OUTPATIENT
Start: 2025-06-05